# Patient Record
Sex: FEMALE | Race: WHITE | Employment: OTHER | URBAN - METROPOLITAN AREA
[De-identification: names, ages, dates, MRNs, and addresses within clinical notes are randomized per-mention and may not be internally consistent; named-entity substitution may affect disease eponyms.]

---

## 2020-05-20 ENCOUNTER — APPOINTMENT (OUTPATIENT)
Dept: CT IMAGING | Age: 85
End: 2020-05-20
Attending: STUDENT IN AN ORGANIZED HEALTH CARE EDUCATION/TRAINING PROGRAM
Payer: MEDICARE

## 2020-05-20 ENCOUNTER — HOSPITAL ENCOUNTER (OUTPATIENT)
Age: 85
Setting detail: OBSERVATION
Discharge: HOME | End: 2020-05-21
Attending: STUDENT IN AN ORGANIZED HEALTH CARE EDUCATION/TRAINING PROGRAM | Admitting: INTERNAL MEDICINE
Payer: MEDICARE

## 2020-05-20 ENCOUNTER — APPOINTMENT (OUTPATIENT)
Dept: GENERAL RADIOLOGY | Age: 85
End: 2020-05-20
Attending: STUDENT IN AN ORGANIZED HEALTH CARE EDUCATION/TRAINING PROGRAM
Payer: MEDICARE

## 2020-05-20 ENCOUNTER — APPOINTMENT (OUTPATIENT)
Dept: NON INVASIVE DIAGNOSTICS | Age: 85
End: 2020-05-20
Attending: INTERNAL MEDICINE
Payer: MEDICARE

## 2020-05-20 DIAGNOSIS — R55 NEAR SYNCOPE: ICD-10-CM

## 2020-05-20 DIAGNOSIS — N39.0 URINARY TRACT INFECTION WITHOUT HEMATURIA, SITE UNSPECIFIED: Primary | ICD-10-CM

## 2020-05-20 PROBLEM — R42 DIZZY: Status: ACTIVE | Noted: 2020-05-20

## 2020-05-20 LAB
ALBUMIN SERPL-MCNC: 3.8 G/DL (ref 3.5–5)
ALBUMIN/GLOB SERPL: 1.2 {RATIO} (ref 1.1–2.2)
ALP SERPL-CCNC: 53 U/L (ref 45–117)
ALT SERPL-CCNC: 17 U/L (ref 12–78)
ANION GAP SERPL CALC-SCNC: 8 MMOL/L (ref 5–15)
APPEARANCE UR: CLEAR
AST SERPL-CCNC: 13 U/L (ref 15–37)
ATRIAL RATE: 67 BPM
BACTERIA URNS QL MICRO: ABNORMAL /HPF
BASOPHILS # BLD: 0 K/UL (ref 0–0.1)
BASOPHILS NFR BLD: 0 % (ref 0–1)
BILIRUB SERPL-MCNC: 0.6 MG/DL (ref 0.2–1)
BILIRUB UR QL: NEGATIVE
BUN SERPL-MCNC: 12 MG/DL (ref 6–20)
BUN/CREAT SERPL: 16 (ref 12–20)
CALCIUM SERPL-MCNC: 8.8 MG/DL (ref 8.5–10.1)
CALCULATED P AXIS, ECG09: 28 DEGREES
CALCULATED R AXIS, ECG10: -77 DEGREES
CALCULATED T AXIS, ECG11: -43 DEGREES
CHLORIDE SERPL-SCNC: 103 MMOL/L (ref 97–108)
CHOLEST SERPL-MCNC: 196 MG/DL
CO2 SERPL-SCNC: 28 MMOL/L (ref 21–32)
COLOR UR: ABNORMAL
COMMENT, HOLDF: NORMAL
COMMENT, HOLDF: NORMAL
CREAT SERPL-MCNC: 0.77 MG/DL (ref 0.55–1.02)
DIAGNOSIS, 93000: NORMAL
DIFFERENTIAL METHOD BLD: NORMAL
ECHO AO ROOT DIAM: 2.83 CM
ECHO EST RA PRESSURE: 3 MMHG
ECHO LA VOL 2C: 24.42 ML (ref 22–52)
ECHO LA VOL 4C: 26.82 ML (ref 22–52)
ECHO LA VOL BP: 28.37 ML (ref 22–52)
ECHO LA VOL/BSA BIPLANE: 21.29 ML/M2 (ref 16–28)
ECHO LA VOLUME INDEX A2C: 18.33 ML/M2 (ref 16–28)
ECHO LA VOLUME INDEX A4C: 20.13 ML/M2 (ref 16–28)
ECHO LV EDV TEICHHOLZ: 0.31 ML
ECHO LV ESV TEICHHOLZ: 0.06 ML
ECHO LV INTERNAL DIMENSION DIASTOLIC: 3.35 CM (ref 3.9–5.3)
ECHO LV INTERNAL DIMENSION SYSTOLIC: 1.7 CM
ECHO LV IVSD: 1.13 CM (ref 0.6–0.9)
ECHO LV MASS 2D: 123.3 G (ref 67–162)
ECHO LV MASS INDEX 2D: 92.5 G/M2 (ref 43–95)
ECHO LV POSTERIOR WALL DIASTOLIC: 1.05 CM (ref 0.6–0.9)
ECHO LVOT DIAM: 1.82 CM
ECHO LVOT PEAK GRADIENT: 2 MMHG
ECHO LVOT PEAK VELOCITY: 70.72 CM/S
ECHO PULMONARY ARTERY SYSTOLIC PRESSURE (PASP): 32.5 MMHG
ECHO PV MAX VELOCITY: 78.64 CM/S
ECHO PV PEAK GRADIENT: 2.5 MMHG
ECHO RIGHT VENTRICULAR SYSTOLIC PRESSURE (RVSP): 32.5 MMHG
ECHO RV INTERNAL DIMENSION: 3.35 CM
ECHO TV REGURGITANT MAX VELOCITY: 271.44 CM/S
ECHO TV REGURGITANT PEAK GRADIENT: 29.5 MMHG
EOSINOPHIL # BLD: 0 K/UL (ref 0–0.4)
EOSINOPHIL NFR BLD: 1 % (ref 0–7)
EPITH CASTS URNS QL MICRO: ABNORMAL /LPF
ERYTHROCYTE [DISTWIDTH] IN BLOOD BY AUTOMATED COUNT: 13.1 % (ref 11.5–14.5)
EST. AVERAGE GLUCOSE BLD GHB EST-MCNC: 148 MG/DL
GLOBULIN SER CALC-MCNC: 3.2 G/DL (ref 2–4)
GLUCOSE BLD STRIP.AUTO-MCNC: 124 MG/DL (ref 65–100)
GLUCOSE BLD STRIP.AUTO-MCNC: 126 MG/DL (ref 65–100)
GLUCOSE BLD STRIP.AUTO-MCNC: 137 MG/DL (ref 65–100)
GLUCOSE SERPL-MCNC: 137 MG/DL (ref 65–100)
GLUCOSE UR STRIP.AUTO-MCNC: NEGATIVE MG/DL
HBA1C MFR BLD: 6.8 % (ref 4–5.6)
HCT VFR BLD AUTO: 40.6 % (ref 35–47)
HDLC SERPL-MCNC: 78 MG/DL
HDLC SERPL: 2.5 {RATIO} (ref 0–5)
HGB BLD-MCNC: 13.6 G/DL (ref 11.5–16)
HGB UR QL STRIP: ABNORMAL
HYALINE CASTS URNS QL MICRO: ABNORMAL /LPF (ref 0–5)
IMM GRANULOCYTES # BLD AUTO: 0 K/UL (ref 0–0.04)
IMM GRANULOCYTES NFR BLD AUTO: 0 % (ref 0–0.5)
KETONES UR QL STRIP.AUTO: NEGATIVE MG/DL
LDLC SERPL CALC-MCNC: 92.6 MG/DL (ref 0–100)
LEUKOCYTE ESTERASE UR QL STRIP.AUTO: ABNORMAL
LIPID PROFILE,FLP: NORMAL
LVFS 2D: 49.24 %
LVSV (TEICH): 23.89 ML
LYMPHOCYTES # BLD: 2 K/UL (ref 0.8–3.5)
LYMPHOCYTES NFR BLD: 33 % (ref 12–49)
MAGNESIUM SERPL-MCNC: 1.9 MG/DL (ref 1.6–2.4)
MCH RBC QN AUTO: 31.4 PG (ref 26–34)
MCHC RBC AUTO-ENTMCNC: 33.5 G/DL (ref 30–36.5)
MCV RBC AUTO: 93.8 FL (ref 80–99)
MONOCYTES # BLD: 0.5 K/UL (ref 0–1)
MONOCYTES NFR BLD: 8 % (ref 5–13)
NEUTS SEG # BLD: 3.5 K/UL (ref 1.8–8)
NEUTS SEG NFR BLD: 58 % (ref 32–75)
NITRITE UR QL STRIP.AUTO: POSITIVE
NRBC # BLD: 0 K/UL (ref 0–0.01)
NRBC BLD-RTO: 0 PER 100 WBC
P-R INTERVAL, ECG05: 116 MS
PH UR STRIP: 7.5 [PH] (ref 5–8)
PLATELET # BLD AUTO: 157 K/UL (ref 150–400)
PMV BLD AUTO: 11.5 FL (ref 8.9–12.9)
POTASSIUM SERPL-SCNC: 3.9 MMOL/L (ref 3.5–5.1)
PROCALCITONIN SERPL-MCNC: <0.05 NG/ML
PROT SERPL-MCNC: 7 G/DL (ref 6.4–8.2)
PROT UR STRIP-MCNC: NEGATIVE MG/DL
Q-T INTERVAL, ECG07: 394 MS
QRS DURATION, ECG06: 116 MS
QTC CALCULATION (BEZET), ECG08: 416 MS
RBC # BLD AUTO: 4.33 M/UL (ref 3.8–5.2)
RBC #/AREA URNS HPF: ABNORMAL /HPF (ref 0–5)
SAMPLES BEING HELD,HOLD: NORMAL
SAMPLES BEING HELD,HOLD: NORMAL
SERVICE CMNT-IMP: ABNORMAL
SODIUM SERPL-SCNC: 139 MMOL/L (ref 136–145)
SP GR UR REFRACTOMETRY: 1.01 (ref 1–1.03)
TRIGL SERPL-MCNC: 127 MG/DL (ref ?–150)
TROPONIN I SERPL-MCNC: <0.05 NG/ML
TROPONIN I SERPL-MCNC: <0.05 NG/ML
TSH SERPL DL<=0.05 MIU/L-ACNC: 1.03 UIU/ML (ref 0.36–3.74)
UROBILINOGEN UR QL STRIP.AUTO: 0.2 EU/DL (ref 0.2–1)
VENTRICULAR RATE, ECG03: 67 BPM
VLDLC SERPL CALC-MCNC: 25.4 MG/DL
WBC # BLD AUTO: 6 K/UL (ref 3.6–11)
WBC URNS QL MICRO: ABNORMAL /HPF (ref 0–4)

## 2020-05-20 PROCEDURE — 96374 THER/PROPH/DIAG INJ IV PUSH: CPT

## 2020-05-20 PROCEDURE — 84145 PROCALCITONIN (PCT): CPT

## 2020-05-20 PROCEDURE — 84443 ASSAY THYROID STIM HORMONE: CPT

## 2020-05-20 PROCEDURE — 96361 HYDRATE IV INFUSION ADD-ON: CPT

## 2020-05-20 PROCEDURE — 99218 HC RM OBSERVATION: CPT

## 2020-05-20 PROCEDURE — 84484 ASSAY OF TROPONIN QUANT: CPT

## 2020-05-20 PROCEDURE — 74011250636 HC RX REV CODE- 250/636: Performed by: INTERNAL MEDICINE

## 2020-05-20 PROCEDURE — 82962 GLUCOSE BLOOD TEST: CPT

## 2020-05-20 PROCEDURE — 96372 THER/PROPH/DIAG INJ SC/IM: CPT

## 2020-05-20 PROCEDURE — 36415 COLL VENOUS BLD VENIPUNCTURE: CPT

## 2020-05-20 PROCEDURE — 83735 ASSAY OF MAGNESIUM: CPT

## 2020-05-20 PROCEDURE — 93005 ELECTROCARDIOGRAM TRACING: CPT

## 2020-05-20 PROCEDURE — 74011250637 HC RX REV CODE- 250/637: Performed by: INTERNAL MEDICINE

## 2020-05-20 PROCEDURE — 74011250636 HC RX REV CODE- 250/636: Performed by: STUDENT IN AN ORGANIZED HEALTH CARE EDUCATION/TRAINING PROGRAM

## 2020-05-20 PROCEDURE — 80053 COMPREHEN METABOLIC PANEL: CPT

## 2020-05-20 PROCEDURE — 71045 X-RAY EXAM CHEST 1 VIEW: CPT

## 2020-05-20 PROCEDURE — 93306 TTE W/DOPPLER COMPLETE: CPT

## 2020-05-20 PROCEDURE — 99285 EMERGENCY DEPT VISIT HI MDM: CPT

## 2020-05-20 PROCEDURE — 80061 LIPID PANEL: CPT

## 2020-05-20 PROCEDURE — 74011000250 HC RX REV CODE- 250: Performed by: STUDENT IN AN ORGANIZED HEALTH CARE EDUCATION/TRAINING PROGRAM

## 2020-05-20 PROCEDURE — 81001 URINALYSIS AUTO W/SCOPE: CPT

## 2020-05-20 PROCEDURE — 70450 CT HEAD/BRAIN W/O DYE: CPT

## 2020-05-20 PROCEDURE — 83036 HEMOGLOBIN GLYCOSYLATED A1C: CPT

## 2020-05-20 PROCEDURE — 77030038269 HC DRN EXT URIN PURWCK BARD -A

## 2020-05-20 PROCEDURE — 85025 COMPLETE CBC W/AUTO DIFF WBC: CPT

## 2020-05-20 RX ORDER — DIPHENHYDRAMINE HCL 25 MG
25 CAPSULE ORAL
Status: DISCONTINUED | OUTPATIENT
Start: 2020-05-20 | End: 2020-05-21 | Stop reason: HOSPADM

## 2020-05-20 RX ORDER — SODIUM CHLORIDE 9 MG/ML
500 INJECTION, SOLUTION INTRAVENOUS ONCE
Status: COMPLETED | OUTPATIENT
Start: 2020-05-20 | End: 2020-05-20

## 2020-05-20 RX ORDER — LISINOPRIL 20 MG/1
40 TABLET ORAL DAILY
Status: DISCONTINUED | OUTPATIENT
Start: 2020-05-20 | End: 2020-05-21 | Stop reason: HOSPADM

## 2020-05-20 RX ORDER — ENOXAPARIN SODIUM 100 MG/ML
40 INJECTION SUBCUTANEOUS DAILY
Status: DISCONTINUED | OUTPATIENT
Start: 2020-05-20 | End: 2020-05-21 | Stop reason: HOSPADM

## 2020-05-20 RX ORDER — SODIUM CHLORIDE 9 MG/ML
75 INJECTION, SOLUTION INTRAVENOUS CONTINUOUS
Status: DISCONTINUED | OUTPATIENT
Start: 2020-05-20 | End: 2020-05-21 | Stop reason: HOSPADM

## 2020-05-20 RX ORDER — MAGNESIUM SULFATE 100 %
4 CRYSTALS MISCELLANEOUS AS NEEDED
Status: DISCONTINUED | OUTPATIENT
Start: 2020-05-20 | End: 2020-05-21 | Stop reason: HOSPADM

## 2020-05-20 RX ORDER — CLOPIDOGREL BISULFATE 75 MG/1
75 TABLET ORAL DAILY
Status: DISCONTINUED | OUTPATIENT
Start: 2020-05-20 | End: 2020-05-21 | Stop reason: HOSPADM

## 2020-05-20 RX ORDER — INSULIN LISPRO 100 [IU]/ML
INJECTION, SOLUTION INTRAVENOUS; SUBCUTANEOUS
Status: DISCONTINUED | OUTPATIENT
Start: 2020-05-20 | End: 2020-05-21 | Stop reason: HOSPADM

## 2020-05-20 RX ORDER — ACETAMINOPHEN 325 MG/1
650 TABLET ORAL
Status: DISCONTINUED | OUTPATIENT
Start: 2020-05-20 | End: 2020-05-21 | Stop reason: HOSPADM

## 2020-05-20 RX ORDER — METFORMIN HYDROCHLORIDE 500 MG/1
500 TABLET ORAL 2 TIMES DAILY WITH MEALS
COMMUNITY

## 2020-05-20 RX ORDER — DEXTROSE MONOHYDRATE 100 MG/ML
0-250 INJECTION, SOLUTION INTRAVENOUS AS NEEDED
Status: DISCONTINUED | OUTPATIENT
Start: 2020-05-20 | End: 2020-05-21 | Stop reason: CLARIF

## 2020-05-20 RX ORDER — GLIPIZIDE 5 MG/1
10 TABLET, FILM COATED, EXTENDED RELEASE ORAL 2 TIMES DAILY
Status: DISCONTINUED | OUTPATIENT
Start: 2020-05-20 | End: 2020-05-20

## 2020-05-20 RX ORDER — PANTOPRAZOLE SODIUM 40 MG/1
40 TABLET, DELAYED RELEASE ORAL
Status: DISCONTINUED | OUTPATIENT
Start: 2020-05-21 | End: 2020-05-20

## 2020-05-20 RX ORDER — METFORMIN HYDROCHLORIDE 500 MG/1
500 TABLET ORAL 2 TIMES DAILY WITH MEALS
Status: DISCONTINUED | OUTPATIENT
Start: 2020-05-20 | End: 2020-05-21 | Stop reason: HOSPADM

## 2020-05-20 RX ORDER — ZOLPIDEM TARTRATE 5 MG/1
5 TABLET ORAL
Status: DISCONTINUED | OUTPATIENT
Start: 2020-05-20 | End: 2020-05-21 | Stop reason: HOSPADM

## 2020-05-20 RX ORDER — AMLODIPINE BESYLATE 5 MG/1
2.5 TABLET ORAL DAILY
Status: DISCONTINUED | OUTPATIENT
Start: 2020-05-20 | End: 2020-05-20

## 2020-05-20 RX ORDER — PRAVASTATIN SODIUM 20 MG/1
20 TABLET ORAL
Status: DISCONTINUED | OUTPATIENT
Start: 2020-05-20 | End: 2020-05-20

## 2020-05-20 RX ADMIN — ENOXAPARIN SODIUM 40 MG: 40 INJECTION SUBCUTANEOUS at 12:52

## 2020-05-20 RX ADMIN — CLOPIDOGREL BISULFATE 75 MG: 75 TABLET ORAL at 12:52

## 2020-05-20 RX ADMIN — CEFTRIAXONE 1 G: 1 INJECTION, POWDER, FOR SOLUTION INTRAMUSCULAR; INTRAVENOUS at 10:10

## 2020-05-20 RX ADMIN — LISINOPRIL 40 MG: 20 TABLET ORAL at 12:52

## 2020-05-20 RX ADMIN — SODIUM CHLORIDE 500 ML: 900 INJECTION, SOLUTION INTRAVENOUS at 07:43

## 2020-05-20 RX ADMIN — SODIUM CHLORIDE 75 ML/HR: 900 INJECTION, SOLUTION INTRAVENOUS at 12:20

## 2020-05-20 RX ADMIN — METFORMIN HYDROCHLORIDE 500 MG: 500 TABLET ORAL at 16:35

## 2020-05-20 RX ADMIN — SODIUM CHLORIDE 500 ML: 900 INJECTION, SOLUTION INTRAVENOUS at 10:10

## 2020-05-20 NOTE — ED PROVIDER NOTES
The patient is an 63-year-old female history of diabetes, hyperparathyroidism, hyperlipidemia, hypertension presenting to the emergency department today with fatigue and dizziness. She states that last night when she went to bed her blood pressure was a little bit elevated so she took her usual medications and felt otherwise fine. This morning she woke up around 4:30 AM which is her typical wake-up time and she felt fine. She got up to make coffee and after standing for a few minutes she got dizzy and felt as if she may pass out. She was able to sit down and symptoms improved within a minute or 2. Currently she is completely asymptomatic. She denies any sweatiness, chest pain, shortness of breath, focal weakness or numbness during the event. She denies any recent illness with vomiting or diarrhea. No recent fevers or chills. She denies any abdominal pain. Denies any recent medication changes. She is not on any blood thinners other than Plavix. Past Medical History:   Diagnosis Date    Constipation     Diabetes ([facility]     Glaucoma     Hyperlipemia     Hyperparathyroidism ([facility]     Hypertension     Joint pain     Kidney stone     Migraine     Osteoporosis     TIA (transient ischemic attack)        No past surgical history on file.       Family History:   Problem Relation Age of Onset    Cancer Mother         bladder       Social History     Socioeconomic History    Marital status:      Spouse name: Not on file    Number of children: Not on file    Years of education: Not on file    Highest education level: Not on file   Occupational History    Not on file   Social Needs    Financial resource strain: Not on file    Food insecurity     Worry: Not on file     Inability: Not on file    Transportation needs     Medical: Not on file     Non-medical: Not on file   Tobacco Use    Smoking status: Former Smoker    Smokeless tobacco: Never Used   Substance and Sexual Activity    Alcohol use: No    Drug use: Not on file    Sexual activity: Not on file   Lifestyle    Physical activity     Days per week: Not on file     Minutes per session: Not on file    Stress: Not on file   Relationships    Social connections     Talks on phone: Not on file     Gets together: Not on file     Attends Taoism service: Not on file     Active member of club or organization: Not on file     Attends meetings of clubs or organizations: Not on file     Relationship status: Not on file    Intimate partner violence     Fear of current or ex partner: Not on file     Emotionally abused: Not on file     Physically abused: Not on file     Forced sexual activity: Not on file   Other Topics Concern    Not on file   Social History Narrative    Not on file         ALLERGIES: Codeine; Contrast dye [iodine]; Seafood; and Zocor [simvastatin]    Review of Systems   Constitutional: Negative for chills and fever. HENT: Negative for congestion and rhinorrhea. Eyes: Negative for redness and visual disturbance. Respiratory: Negative for cough and shortness of breath. Cardiovascular: Negative for chest pain and leg swelling. Gastrointestinal: Negative for abdominal pain, diarrhea, nausea and vomiting. Genitourinary: Negative for dysuria, flank pain, frequency, hematuria and urgency. Musculoskeletal: Negative for arthralgias, back pain, myalgias and neck pain. Skin: Negative for rash and wound. Allergic/Immunologic: Negative for immunocompromised state. Neurological: Positive for dizziness. Negative for headaches. Vitals:    05/20/20 0732   BP: 173/74   Pulse: 76   Resp: 18   Temp: 98.4 °F (36.9 °C)   SpO2: 97%   Weight: 59.4 kg (131 lb)   Height: 4' 9\" (1.448 m)            Physical Exam  Vitals signs and nursing note reviewed. Constitutional:       General: She is not in acute distress. Appearance: She is well-developed. She is not diaphoretic. HENT:      Head: Normocephalic. Mouth/Throat:      Pharynx: No oropharyngeal exudate. Eyes:      General:         Right eye: No discharge. Left eye: No discharge. Pupils: Pupils are equal, round, and reactive to light. Neck:      Musculoskeletal: Normal range of motion and neck supple. Cardiovascular:      Rate and Rhythm: Normal rate and regular rhythm. Heart sounds: Normal heart sounds. No murmur. No friction rub. No gallop. Pulmonary:      Effort: Pulmonary effort is normal. No respiratory distress. Breath sounds: Normal breath sounds. No stridor. No wheezing or rales. Abdominal:      General: Bowel sounds are normal. There is no distension. Palpations: Abdomen is soft. Tenderness: There is no abdominal tenderness. There is no guarding or rebound. Musculoskeletal: Normal range of motion. General: No deformity. Skin:     General: Skin is warm and dry. Capillary Refill: Capillary refill takes less than 2 seconds. Findings: No rash. Neurological:      Mental Status: She is alert and oriented to person, place, and time. Comments: CN II-XII tested and intact  Speech is clear and fluid  Tongue protrusion normal  5/5 b/l strength with shoulder/elbow flexion and extension  Equal  strength  5/5 b/l strength with hip extension, knee flexion/extension  Symmetric dorsi and plantar-flexion of feet  Sensation intact in face and throughout all 4 extremities  No dysmetria   No truncal ataxia    Psychiatric:         Behavior: Behavior normal.          EKG Interpretation:   ED Physician interpretation  NSR rate 67, LAD with RBBB, no ST elevation or depression; unchanged compared to prior 12/27/14    Labs Reviewed:   UA consistent with UTI  No leukocytosis or anemia  Troponin normal    Imaging Reviewed:   Chest x-ray without acute process  CT head without acute process      Course:  500 mL of IV fluids given    9:44 AM pt re-evaluated. Just got up to use bedside commode.  Was markedly fatigued, dizzy and tachycardic to 130's. Hospitalist Garo Serve for Admission  9:54 AM    ED Room Number: ZB72/03  Patient Name and age:  Clemente Inman 80 y.o.  female  Working Diagnosis:   1. Urinary tract infection without hematuria, site unspecified    2. Near syncope        COVID-19 Suspicion:  no    Code Status:  Full Code  Readmission: no  Isolation Requirements:  no  Recommended Level of Care:  telemetry  Department:[facility] ED - [phone]  Other:  80 y.o. female near syncope at home, has UTI otherwise workup ok. Received IVF 500cc, stood her up and gets markedly dizzy, tachycardic to 130s/sob. Getting ctx for abx. MDM:  80-year-old female presented today with dizziness and near syncope at home just prior to arrival.  Upon my assessment she is neurologically intact. Her blood pressure is elevated but her vital signs have been otherwise stable. No focal deficits to suggest ICH. History is not consistent with TIA. EKG without significant arrhythmia or signs of ischemia. Troponin normal.  No  significant electrolyte disturbance or significant anemia. She was found to have a UTI which was treated with ceftriaxone. Initial plan was to discharge her home however when she stood up to use the commode she got markedly fatigued, nearly passed out again and was tachycardic to the 130s. This time I do not feel it is safe for the patient to return home therefore I will admit her for IV hydration, antibiotics and further management. Clinical Impression:     ICD-10-CM ICD-9-CM    1. Urinary tract infection without hematuria, site unspecified N39.0 599.0    2.  Near syncope R55 780.2            Disposition: [address] [place] [address],

## 2020-05-20 NOTE — DIABETES MGMT
Diabetes CNS received call back from Dr. Any Balbuena. Patient's home diabetes medication regime is 500 mg Metformin twice daily. Patient's most recent visit with Dr. Reuben Urbina for refills was 5/2/20. Patient has not been on glipizide since 2018. Updated diabetes medication history table below:    Diabetes medication history  Drug class Currently in use Discontinued Never used   Biguanide Metformin 500 mg twice daily       DDP-4 inhibitor          Sulfonylurea        Thiazolidinedione         GLP-1 RA         SGLT-2 inhibitors         Basal insulin         Bolus insulin           Recommendations     Updated recommendations:    Continue current diabetes medication regime of Metformin 500 mg twice daily and corrective insulin.        Use Subcutaneous Insulin Order set (5714):  Corrective insulin  Correctional Scale for Normal Sensitivity     200-249- 2units Humalog  [id number] Humalog  277-239-0kwlyp Humalog  [id number] Humalog  Over 400- 10units Humalog     Do NOT hold for NPO; give in addition to meal time insulin dose.     If patient does not eat, -give correction dose only.        Consider referrals     [x]?         Diabetes Self-Management Training through [facility] (Phone [phone] to schedule appointment)

## 2020-05-20 NOTE — ROUTINE PROCESS
Bedside shift change report given to Be Donato RN (oncoming nurse) by Ihsan Zhong RN (offgoing nurse). Report included the following information SBAR, Kardex, Intake/Output, MAR and Accordion.

## 2020-05-20 NOTE — ED NOTES
TRANSFER - OUT REPORT:    Verbal report given to Emiliana RN (name) on [facility]  being transferred to 5th floor (unit) for routine progression of care       Report consisted of patients Situation, Background, Assessment and   Recommendations(SBAR). Information from the following report(s) SBAR, Kardex, ED Summary, Intake/Output, Recent Results and Cardiac Rhythm NSR was reviewed with the receiving nurse. Lines:   Peripheral IV 05/20/20 Right Antecubital (Active)   Site Assessment Clean, dry, & intact 5/20/2020  7:41 AM   Phlebitis Assessment 0 5/20/2020  7:41 AM   Dressing Status Clean, dry, & intact 5/20/2020  7:41 AM   Dressing Type Transparent 5/20/2020  7:41 AM   Hub Color/Line Status Pink 5/20/2020  7:41 AM   Action Taken Blood drawn 5/20/2020  7:41 AM   Alcohol Cap Used Yes 5/20/2020  7:41 AM        Opportunity for questions and clarification was provided.       Patient transported with:   Monitor  Registered Nurse

## 2020-05-20 NOTE — PROGRESS NOTES
.Reason for Admission:   Urinary tract infection                   RUR Score:          N/a patient is OBS           Plan for utilizing home health:      Patient states she has a visit once a year from a [facility] home health type service. PCP: First and Last name:  Patsy Law   Name of Practice:    Are you a current patient: Yes/No:  Yes   Approximate date of last visit:  Patient states she had a telehealth visit approx. 2 weeks ago. Current Advanced Directive/Advance Care Plan:  Full code, no AD on file, decision maker is daughter Nelson Giles [phone]                          Transition of Care Plan:                     I conducted the assessment with the patient at the bedside. I wore a mask for the duration of the evaluation. Patient lives with her daughter in a single story home with 5-6 steps in. Prior to hospital admission, she was independent with ADLs and active. She does not drive, but when she needs transportation it is provided by her family. Patient has what she describes as an annual visit from a [facility] home health service. She does not use any DME. She has prescription coverage under her insurance and usually gets her prescriptions from the [address] [place] [address] @ [facility]. She had a telehealth appointment with her PCP, Dr. Praneeth Rivas, about two weeks ago. When she is ready for discharge one of her daughters will be able to transport her home. Noted that patient is OBS status; provided patient with education about this; State Obs and [place] letters signed and placed on chart. Plan:   1. Noted PT consult to help determine discharge needs  2. Likely to discharge home with family assistance  3. Follow up with PCP, specialists as recommended  4. CM to continue to follow and assist with discharge planning. Care Management Interventions  PCP Verified by CM: Yes(Dr. Patsy Law)  Mode of Transport at Discharge:  Other (see comment)(Family)  MyChart Signup: No  Discharge Durable Medical Equipment: No  Health Maintenance Reviewed: Yes  Physical Therapy Consult: Yes  Occupational Therapy Consult: No  Speech Therapy Consult: No  Current Support Network:  Other(Daughter lives with her)  Confirm Follow Up Transport: Family  Discharge Location  Discharge Placement: Home with family assistance    Nawaf Mora

## 2020-05-20 NOTE — ED NOTES
Gave update to Areli Mejia w/ pt's permission.  Phone number for [facility] given to pt per her request.

## 2020-05-20 NOTE — H&P
[facility]    Hospitalist Admission Note      NAME:  John Friedman   :   1933   MRN:  [id number]     PCP:  Jovanny Sanford MD     Date/Time of service:  2020 10:45 AM          Subjective:     CHIEF COMPLAINT: presyncope     HISTORY OF PRESENT ILLNESS:     Ms. Janice Daugherty is a 80 y.o.  female who presented to the Emergency Department complaining of presyncope. She is a poor historian, tangential, and may have dementia. No family is present. Presyncope occurred this AM and resolved. She was up having coffee, prior to eating. ER workup finds UTI and no other issues. NO SIRS criteria. We will admit her for observation. Past Medical History:   Diagnosis Date    Constipation     [facility] ([facility]     Glaucoma     Hyperlipemia     Hyperparathyroidism ([facility]     Hypertension     Joint pain     Kidney stone     Migraine     Osteoporosis     Hx completed stroke         No past surgical history on file. Social History     Tobacco Use    Smoking status: Former Smoker    Smokeless tobacco: Never Used   Substance Use Topics    Alcohol use: No        Family History   Problem Relation Age of Onset    Cancer Mother         bladder      Family hx cannot be fully assessed, since the patient cannot provide information    Allergies   Allergen Reactions    Codeine Nausea and Vomiting    Contrast Dye [Iodine] Rash    Seafood Nausea and Vomiting    Zocor [Simvastatin] Other (comments)     Leg cramps          Prior to Admission medications    Medication Sig Start Date End Date Taking? Authorizing Provider   miscellaneous medical supply (BLOOD PRESSURE CUFF) misc by Does Not Apply route. Yes Provider, Historical   metFORMIN (GLUCOPHAGE) 1,000 mg tablet Take 1 tablet by mouth two (2) times daily (with meals). 14  Yes Nazario Weaver MD   clopidogrel (PLAVIX) 75 mg tablet Take 1 tablet by mouth daily.  14  Yes Can Dunlap MD   lisinopril (PRINIVIL, ZESTRIL) 40 mg tablet Take 40 mg by mouth daily. Yes Other, MD Altagracia   butalbital-aspirin-caffeine [facility]) capsule Take 1 capsule by mouth every eight (8) hours as needed for Headache. Yes Provider, Historical   amLODIPine (NORVASC) 2.5 mg tablet Take 1 tablet by mouth daily. 12/30/14   Laurie Richards MD   pravastatin (PRAVACHOL) 20 mg tablet Take 1 tablet by mouth nightly. 12/28/14   Compa Buckley MD   glipiZIDE SR (GLUCOTROL) 10 mg CR tablet Take 10 mg by mouth two (2) times a day. Provider, Historical   omeprazole (PRILOSEC) 20 mg capsule Take 20 mg by mouth daily. Provider, Historical   cholecalciferol, VITAMIN D3, (VITAMIN D3) 5,000 unit tab tablet Take 5,000 Units by mouth every seven (7) days.  On Thursdays    Provider, Historical       Review of Systems:  (bold if positive, if negative)    Gen:  Eyes:  ENT:  CVS:  syncopePulm:  GI:  :  MS:  Skin:  Psych:  Endo:  Hem:  Renal:  Neuro:        Objective:      VITALS:    Vital signs reviewed; most recent are:    Visit Vitals  /72   Pulse 88   Temp 98.4 °F (36.9 °C)   Resp 23   Ht 4' 9\" (1.448 m)   Wt 59.4 kg (131 lb)   SpO2 100%   BMI 28.35 kg/m²     SpO2 Readings from Last 6 Encounters:   05/20/20 100%   01/12/15 97%   12/29/14 98%            Intake/Output Summary (Last 24 hours) at 5/20/2020 1045  Last data filed at 5/20/2020 8470  Gross per 24 hour   Intake 500 ml   Output    Net 500 ml        Exam:     Physical Exam:    Gen:  Frail, in no acute distress  HEENT:  Pink conjunctivae, PERRL, hearing intact to voice, moist mucous membranes  Neck:  Supple, without masses, thyroid non-tender  Resp:  No accessory muscle use, clear breath sounds without wheezes rales or rhonchi  Card:  No murmurs, normal S1, S2 without thrills, bruits or peripheral edema  Abd:  Soft, non-tender, non-distended, normoactive bowel sounds are present, no mass  Lymph:  No cervical or inguinal adenopathy  Musc:  No cyanosis or clubbing  Skin:  No rashes or ulcers, skin turgor is good  Neuro:  Cranial nerves are grossly intact, general motor weakness, follows commands   Psych:  Poor insight, oriented to person, place and time, tearful     Labs:    Recent Labs     05/20/20  0742   WBC 6.0   HGB 13.6   HCT 40.6        Recent Labs     05/20/20  0742      K 3.9      CO2 28   *   BUN 12   CREA 0.77   CA 8.8   MG 1.9   ALB 3.8   TBILI 0.6   SGOT 13*   ALT 17     Lab Results   Component Value Date/Time    Glucose (POC) 145 (H) 12/29/2014 11:39 AM    Glucose (POC) 159 (H) 12/29/2014 07:50 AM     No results for input(s): PH, PCO2, PO2, HCO3, FIO2 in the last 72 hours. No results for input(s): INR, INREXT in the last 72 hours. All Micro Results     None          I have reviewed previous records       Assessment and Plan:      UTI (urinary tract infection) - POA, no SIRS criteria. Start ceftriaxone and check cx    Dizzy - Symptoms resolved. Unclear etiology. DDx hypotension, hypoglycemia, arrhythmia etc.  Check orthostatics and ECHO. Hydrate. Continue all usual meds    Diabetes type 2 with vascular complications - Diabetic diet and counseling. SSI per protocol. Continue home metformin and glipizide, and see if glipizide leads to hypoglycemia. Check A1c.    HTN (hypertension) - continue norvasc low dose and lisinopril high dose. ACE can exacerbate hypotension, BB should be used over norvasc given hx of CVA    Hx completed stroke - Noted on MRI 7 years ago. No current symptoms. Continue Plavix,     Glaucoma - No drops listed    Hyperlipemia - check panel and continue pravastatin    Hyperparathyroidism - No meds    Joint pain / Osteoporosis - Tylneol prn    Telemetry reviewed:   normal sinus rhythm    Risk of deterioration: high      Total time spent with patient: 79 Minutes I personally reviewed chart, notes, data and current medications in the medical record. I have personally examined and treated the patient at bedside during this period. Care Plan discussed with: Patient, Nursing Staff and >50% of time spent in counseling and coordination of care    Discussed:  D/C Planning       ___________________________________________________    Attending Physician: Lon Quispe MD

## 2020-05-20 NOTE — DIABETES MGMT
[facility]  CLINICAL NURSE SPECIALIST CONSULT    Presentation   Andre Hair is a 80 y.o. female admitted for further evaluation of fatigue and dizziness. PMH is constipation, diabetes, glaucoma, HTN, hyperparathyroidism, join pain, kidney stones, migraines, osteoporosis and CVA. Patient has known Type 2 diabetes, diagnosed approximately 2010. Per chart review, home diabetic medication regime is Metformin 1000 mg twice a day and glipizide 10 mg twice a day. Patient reports that she is no longer taking glipizide and only taking 500 mg of Metformin. Patient is a poor historian so unsure which is the correct doses she is currently taking at home. Called and left message for Dr. Marti Laehsan office (patient's PCP) to get confirmation of home medication regime, awaiting call back. Consulted by Provider for advanced diabetes nursing assessment and care, specifically related to     [x] Home management assessment      Current clinical course has been uncomplicated. Diabetes-related medical history  Acute complications  denies  Neurological complications  Peripheral neuropathy  Microvascular disease  denies  Macrovascular disease  Cerebral vascular accident  Other associated conditions     denies    Diabetes medication history  Drug class Currently in use Discontinued Never used   Biguanide Metformin 1000 mg twice daily     DDP-4 inhibitor       Sulfonylurea Glipizide 10 mg twice daily     Thiazolidinedione      GLP-1 RA      SGLT-2 inhibitors      Basal insulin      Bolus insulin        Subjective   I have all the stuff to check my blood sugar at home I just haven't been doing it. I see my doctor every 3 months and my A1c is always less than 7    Patient reports the following home diabetes self-care practices:  Eating pattern  [x] Breakfast Coffee and sausage biscuit or sausage egg and cheese biscuit  [x] Lunch  [place] or salad  [x] Dinner  Salad/ spaghetti/ chicken with vegetables. [x] Snacks popcorn  [x] Beverages Coffee, V8 juice, coke zero occasionally, milk  Physical activity pattern  Patient reports that she was going to aerobics class weekly at her [facility] until it was closed with the COVID-19 closures. Monitoring pattern  Patient reports that she has the monitor and all the supplies at home, recently replaced the battery in her monitor but she is not checking her blood sugars at home. Taking medications pattern  [x] Consistent administration  [x] Affordable    Social determinants of health impacting diabetes self-management practices   Patient denies any issues or concerns at this time. Objective   Physical exam  General Alert, oriented and in no acute distress. Conversant and cooperative  Vital Signs   Visit Vitals  /86   Pulse (!) 108   Temp 98.4 °F (36.9 °C)   Resp 20   Ht 4' 9\" (1.448 m)   Wt 44.8 kg (98 lb 12.3 oz)   SpO2 98%   BMI 21.37 kg/m²   . Orthostatic BP measurement not indicated  Skin  Warm and dry. No Acanthosis noted along neckline. No lipohypertrophy or lipoatrophy noted at injection sites   Neck   Thyroid smooth and non-tender  Heart   Regular rate and rhythm.  No murmurs, rubs or gallops  Lungs  Clear without rales or rhonchi  Extremities Diabetic foot exam:    Left Foot     Visual Exam: normal    Pulse DP: 1+ (weak)   Filament test: reduced sensation      Right Foot   Visual Exam: normal    Pulse DP: 1+ (weak)   Filament test: reduced sensation         Laboratory  Lab Results   Component Value Date/Time    Hemoglobin A1c 7.8 (H) 12/28/2014 03:25 AM     Lab Results   Component Value Date/Time    LDL, calculated 92.6 05/20/2020 07:42 AM     Lab Results   Component Value Date/Time    Creatinine 0.77 05/20/2020 07:42 AM     Lab Results   Component Value Date/Time    Sodium 139 05/20/2020 07:42 AM    Potassium 3.9 05/20/2020 07:42 AM    Chloride 103 05/20/2020 07:42 AM    CO2 28 05/20/2020 07:42 AM    Anion gap 8 05/20/2020 07:42 AM    Glucose 137 (H) 05/20/2020 07:42 AM    BUN 12 05/20/2020 07:42 AM    Creatinine 0.77 05/20/2020 07:42 AM    BUN/Creatinine ratio 16 05/20/2020 07:42 AM    GFR est AA >60 05/20/2020 07:42 AM    GFR est non-AA >60 05/20/2020 07:42 AM    Calcium 8.8 05/20/2020 07:42 AM    Bilirubin, total 0.6 05/20/2020 07:42 AM    AST (SGOT) 13 (L) 05/20/2020 07:42 AM    Alk. phosphatase 53 05/20/2020 07:42 AM    Protein, total 7.0 05/20/2020 07:42 AM    Albumin 3.8 05/20/2020 07:42 AM    Globulin 3.2 05/20/2020 07:42 AM    A-G Ratio 1.2 05/20/2020 07:42 AM    ALT (SGPT) 17 05/20/2020 07:42 AM     Lab Results   Component Value Date/Time    ALT (SGPT) 17 05/20/2020 07:42 AM       Blood glucose pattern        Evaluation   This 80year old female with known diabetes has achieved inpatient blood glucose target of 140-180mg/dl. Patient arrived to the ED this morning (5/20/20) and has had only two BG readings since arrival, both were below the inpatient target of < 180 mg/dl. Patient's home medications of Metformin 1000 mg twice a day and Glipizide 10 mg twice a day to start at dinner time today (5/20/20). Basal insulin isn't in use. Bolus insulin isn't in use. Patient is eating meals. Regular cardiac diet ordered. Corrective insulin is in use. Patient has not required any corrective insulin since arriving to the ED this morning.      Assessment and Plan   Nursing Diagnosis Risk for unstable blood glucose pattern   Nursing Intervention Domain 2381 Decision-making Support   Nursing Interventions Examined current inpatient diabetes control   Explored factors facilitating and impeding inpatient management  Identified self-management practices impeding diabetes control  Explored corrective strategies with patient and responsible inpatient provider   Informed patient of rational for basal bolus insulin strategy while hospitalized       Recommendations     Continue current diabetes medication regime of Metformin 1000 mg twice daily, Glipizide 10 mg twice daily and corrective insulin. Will updated recommendations if necessary after speaking to patient's PCP. Use Subcutaneous Insulin Order set ([id number]):  Corrective insulin  Correctional Scale for Normal Sensitivity    200-249- 2units Humalog  [id number] Humalog  [id number] Humalog  [id number] Humalog  Over 400- 10units Humalog    Do NOT hold for NPO; give in addition to meal time insulin dose. If patient does not eat, -give correction dose only. Consider referrals    [x] Diabetes Self-Management Training through [facility] (Phone [phone] to schedule appointment)    Time Spent     Total time spent with patient: 30 Minutes   I personally reviewed chart, notes, data and current medications in the medical record. I have personally examined and treated the patient at bedside during this period.     JHONNY Adam  Access via [employer]  [id number]

## 2020-05-21 VITALS
HEIGHT: 57 IN | BODY MASS INDEX: 21.31 KG/M2 | OXYGEN SATURATION: 94 % | RESPIRATION RATE: 18 BRPM | DIASTOLIC BLOOD PRESSURE: 86 MMHG | TEMPERATURE: 98.5 F | SYSTOLIC BLOOD PRESSURE: 176 MMHG | HEART RATE: 110 BPM | WEIGHT: 98.77 LBS

## 2020-05-21 LAB
ALBUMIN SERPL-MCNC: 3 G/DL (ref 3.5–5)
ALBUMIN/GLOB SERPL: 1 {RATIO} (ref 1.1–2.2)
ALP SERPL-CCNC: 46 U/L (ref 45–117)
ALT SERPL-CCNC: 15 U/L (ref 12–78)
ANION GAP SERPL CALC-SCNC: 3 MMOL/L (ref 5–15)
AST SERPL-CCNC: 17 U/L (ref 15–37)
BILIRUB SERPL-MCNC: 0.4 MG/DL (ref 0.2–1)
BUN SERPL-MCNC: 16 MG/DL (ref 6–20)
BUN/CREAT SERPL: 21 (ref 12–20)
CALCIUM SERPL-MCNC: 7.9 MG/DL (ref 8.5–10.1)
CHLORIDE SERPL-SCNC: 111 MMOL/L (ref 97–108)
CO2 SERPL-SCNC: 23 MMOL/L (ref 21–32)
CREAT SERPL-MCNC: 0.77 MG/DL (ref 0.55–1.02)
ERYTHROCYTE [DISTWIDTH] IN BLOOD BY AUTOMATED COUNT: 13.4 % (ref 11.5–14.5)
GLOBULIN SER CALC-MCNC: 3 G/DL (ref 2–4)
GLUCOSE BLD STRIP.AUTO-MCNC: 156 MG/DL (ref 65–100)
GLUCOSE BLD STRIP.AUTO-MCNC: 174 MG/DL (ref 65–100)
GLUCOSE SERPL-MCNC: 122 MG/DL (ref 65–100)
HCT VFR BLD AUTO: 37.4 % (ref 35–47)
HGB BLD-MCNC: 12.1 G/DL (ref 11.5–16)
MAGNESIUM SERPL-MCNC: 1.7 MG/DL (ref 1.6–2.4)
MCH RBC QN AUTO: 30.9 PG (ref 26–34)
MCHC RBC AUTO-ENTMCNC: 32.4 G/DL (ref 30–36.5)
MCV RBC AUTO: 95.4 FL (ref 80–99)
NRBC # BLD: 0 K/UL (ref 0–0.01)
NRBC BLD-RTO: 0 PER 100 WBC
PHOSPHATE SERPL-MCNC: 3.2 MG/DL (ref 2.6–4.7)
PLATELET # BLD AUTO: 127 K/UL (ref 150–400)
PMV BLD AUTO: 12.1 FL (ref 8.9–12.9)
POTASSIUM SERPL-SCNC: 4 MMOL/L (ref 3.5–5.1)
PROT SERPL-MCNC: 6 G/DL (ref 6.4–8.2)
RBC # BLD AUTO: 3.92 M/UL (ref 3.8–5.2)
SERVICE CMNT-IMP: ABNORMAL
SERVICE CMNT-IMP: ABNORMAL
SODIUM SERPL-SCNC: 137 MMOL/L (ref 136–145)
WBC # BLD AUTO: 5.5 K/UL (ref 3.6–11)

## 2020-05-21 PROCEDURE — 96376 TX/PRO/DX INJ SAME DRUG ADON: CPT

## 2020-05-21 PROCEDURE — 97161 PT EVAL LOW COMPLEX 20 MIN: CPT

## 2020-05-21 PROCEDURE — 85027 COMPLETE CBC AUTOMATED: CPT

## 2020-05-21 PROCEDURE — 80053 COMPREHEN METABOLIC PANEL: CPT

## 2020-05-21 PROCEDURE — 96372 THER/PROPH/DIAG INJ SC/IM: CPT

## 2020-05-21 PROCEDURE — 74011250637 HC RX REV CODE- 250/637: Performed by: INTERNAL MEDICINE

## 2020-05-21 PROCEDURE — 36415 COLL VENOUS BLD VENIPUNCTURE: CPT

## 2020-05-21 PROCEDURE — 77030038269 HC DRN EXT URIN PURWCK BARD -A

## 2020-05-21 PROCEDURE — 74011000258 HC RX REV CODE- 258: Performed by: INTERNAL MEDICINE

## 2020-05-21 PROCEDURE — 99218 HC RM OBSERVATION: CPT

## 2020-05-21 PROCEDURE — 84100 ASSAY OF PHOSPHORUS: CPT

## 2020-05-21 PROCEDURE — 82962 GLUCOSE BLOOD TEST: CPT

## 2020-05-21 PROCEDURE — 83735 ASSAY OF MAGNESIUM: CPT

## 2020-05-21 PROCEDURE — 97116 GAIT TRAINING THERAPY: CPT

## 2020-05-21 PROCEDURE — 74011250636 HC RX REV CODE- 250/636: Performed by: INTERNAL MEDICINE

## 2020-05-21 RX ORDER — DEXTROSE 50 % IN WATER (D50W) INTRAVENOUS SYRINGE
12.5-25 AS NEEDED
Status: DISCONTINUED | OUTPATIENT
Start: 2020-05-21 | End: 2020-05-21 | Stop reason: HOSPADM

## 2020-05-21 RX ORDER — CEPHALEXIN 250 MG/1
500 CAPSULE ORAL EVERY 8 HOURS
Status: DISCONTINUED | OUTPATIENT
Start: 2020-05-22 | End: 2020-05-21 | Stop reason: HOSPADM

## 2020-05-21 RX ORDER — CEPHALEXIN 500 MG/1
500 CAPSULE ORAL 3 TIMES DAILY
Qty: 9 CAP | Refills: 0 | Status: SHIPPED | OUTPATIENT
Start: 2020-05-21 | End: 2020-05-24

## 2020-05-21 RX ADMIN — CLOPIDOGREL BISULFATE 75 MG: 75 TABLET ORAL at 08:00

## 2020-05-21 RX ADMIN — SODIUM CHLORIDE 75 ML/HR: 900 INJECTION, SOLUTION INTRAVENOUS at 02:16

## 2020-05-21 RX ADMIN — CEFTRIAXONE SODIUM 1 G: 1 INJECTION, POWDER, FOR SOLUTION INTRAMUSCULAR; INTRAVENOUS at 08:00

## 2020-05-21 RX ADMIN — LISINOPRIL 40 MG: 20 TABLET ORAL at 08:00

## 2020-05-21 RX ADMIN — ENOXAPARIN SODIUM 40 MG: 40 INJECTION SUBCUTANEOUS at 08:01

## 2020-05-21 RX ADMIN — METFORMIN HYDROCHLORIDE 500 MG: 500 TABLET ORAL at 07:51

## 2020-05-21 NOTE — PROGRESS NOTES
Bedside and Verbal shift change report given to Deonte Sneed RN (oncoming nurse) by Archie Son RN (offgoing nurse). Report included the following information SBAR, Kardex, Intake/Output, MAR and Recent Results.

## 2020-05-21 NOTE — PROGRESS NOTES
11:21 AM  RRAT:  15  Transition of Care:    1). Order received for RW. Spoke to pt and signed freedom of choice  2). Sent referral to [facility] accepted and delivered to pt's room, signed receipt and sent to [facility]  3).  Family will transport home once DME is received    Red Armenta

## 2020-05-21 NOTE — DIABETES MGMT
[facility]  CLINICAL NURSE SPECIALIST   Follow-up Progress Note    Presentation   Kaleb Tripathi is a 80 y.o. female admitted for further evaluation of fatigue and dizziness. PMH is constipation, diabetes, glaucoma, HTN, hyperparathyroidism, join pain, kidney stones, migraines, osteoporosis and CVA.       Patient has known Type 2 diabetes, diagnosed approximately 2010. Home diabetic medication regime is Metformin 500 mg twice a day. A1c 5/20/20: 6.8%. Consulted by Provider for advanced specialty nursing care related to inpatient diabetes management. Hyperglycemia management order set is in place. Subjective   I am good and I am going home today.     Objective   Physical exam    General Alert, oriented and in no acute distress. Conversant and cooperative  Vital Signs   Visit Vitals  /85 (BP 1 Location: Left arm, BP Patient Position: At rest)   Pulse 85   Temp 98.5 °F (36.9 °C)   Resp 18   Ht 4' 9\" (1.448 m)   Wt 44.8 kg (98 lb 12.3 oz)   SpO2 94%   BMI 21.37 kg/m²     Skin  Warm and dry  Heart   Regular rate and rhythm.  No murmurs, rubs or gallops  Lungs  Clear to auscultation without rales or rhonchi  Extremities No foot wounds    Laboratory  Recent Results (from the past 24 hour(s))   TROPONIN I    Collection Time: 05/20/20 10:11 AM   Result Value Ref Range    Troponin-I, Qt. <0.05 <0.05 ng/mL   GLUCOSE, POC    Collection Time: 05/20/20 12:21 PM   Result Value Ref Range    Glucose (POC) 126 (H) 65 - 100 mg/dL    Performed by Elke Munguia    ECHO ADULT COMPLETE    Collection Time: 05/20/20  2:46 PM   Result Value Ref Range    LA Volume 28.37 22 - 52 mL    Ao Root D 2.83 cm    LVIDd 3.35 (A) 3.9 - 5.3 cm    LVPWd 1.05 (A) 0.6 - 0.9 cm    LVIDs 1.70 cm    IVSd 1.13 (A) 0.6 - 0.9 cm    LVOT d 1.82 cm    LVOT Peak Velocity 70.72 cm/s    LVOT Peak Gradient 2.0 mmHg    RVIDd 3.35 cm    LA Vol 4C 26.82 22 - 52 mL    LA Vol 2C 24.42 22 - 52 mL    LV Mass .3 67 - 162 g    LV Mass AL Index 92.5 43 - 95 g/m2    RVSP 32.5 mmHg    Est. RA Pressure 3.0 mmHg    Triscuspid Valve Regurgitation Peak Gradient 29.5 mmHg    Pulmonic Valve Max Velocity 78.64 cm/s    TR Max Velocity 271.44 cm/s    LA Vol Index 21.29 16 - 28 ml/m2    PASP 32.5 mmHg    LA Vol Index 18.33 16 - 28 ml/m2    LA Vol Index 20.13 16 - 28 ml/m2    Left Ventricular Fractional Shortening by 2D 56.928744719 %    Left Ventricular End Diastolic Volume by Teichholz Method 4.15668599662485 mL    Left Ventricular End Systolic Volume by Teichholz Method 2.28076473594900 mL    Left Ventricular Stroke Volume by Teichholz Method 87.710219777 mL    PV peak gradient 2.5 mmHg   GLUCOSE, POC    Collection Time: 05/20/20  4:13 PM   Result Value Ref Range    Glucose (POC) 137 (H) 65 - 100 mg/dL    Performed by Bronson Pear    GLUCOSE, POC    Collection Time: 05/20/20  9:15 PM   Result Value Ref Range    Glucose (POC) 124 (H) 65 - 100 mg/dL    Performed by [facility] ([facility])    CBC W/O DIFF    Collection Time: 05/21/20 12:53 AM   Result Value Ref Range    WBC 5.5 3.6 - 11.0 K/uL    RBC 3.92 3.80 - 5.20 M/uL    HGB 12.1 11.5 - 16.0 g/dL    HCT 37.4 35.0 - 47.0 %    MCV 95.4 80.0 - 99.0 FL    MCH 30.9 26.0 - 34.0 PG    MCHC 32.4 30.0 - 36.5 g/dL    RDW 13.4 11.5 - 14.5 %    PLATELET 007 (L) 611 - 400 K/uL    MPV 12.1 8.9 - 12.9 FL    NRBC 0.0 0  WBC    ABSOLUTE NRBC 0.00 0.00 - 0.01 K/uL   MAGNESIUM    Collection Time: 05/21/20 12:53 AM   Result Value Ref Range    Magnesium 1.7 1.6 - 2.4 mg/dL   METABOLIC PANEL, COMPREHENSIVE    Collection Time: 05/21/20 12:53 AM   Result Value Ref Range    Sodium 137 136 - 145 mmol/L    Potassium 4.0 3.5 - 5.1 mmol/L    Chloride 111 (H) 97 - 108 mmol/L    CO2 23 21 - 32 mmol/L    Anion gap 3 (L) 5 - 15 mmol/L    Glucose 122 (H) 65 - 100 mg/dL    BUN 16 6 - 20 MG/DL    Creatinine 0.77 0.55 - 1.02 MG/DL    BUN/Creatinine ratio 21 (H) 12 - 20      GFR est AA >60 >60 ml/min/1.73m2    GFR est non-AA >60 >60 ml/min/1.73m2    Calcium 7.9 (L) 8.5 - 10.1 MG/DL    Bilirubin, total 0.4 0.2 - 1.0 MG/DL    ALT (SGPT) 15 12 - 78 U/L    AST (SGOT) 17 15 - 37 U/L    Alk. phosphatase 46 45 - 117 U/L    Protein, total 6.0 (L) 6.4 - 8.2 g/dL    Albumin 3.0 (L) 3.5 - 5.0 g/dL    Globulin 3.0 2.0 - 4.0 g/dL    A-G Ratio 1.0 (L) 1.1 - 2.2     PHOSPHORUS    Collection Time: 05/21/20 12:53 AM   Result Value Ref Range    Phosphorus 3.2 2.6 - 4.7 MG/DL   GLUCOSE, POC    Collection Time: 05/21/20  7:23 AM   Result Value Ref Range    Glucose (POC) 174 (H) 65 - 100 mg/dL    Performed by Tereza Albarran         Blood glucose pattern        Evaluation   This 80year old female with known diabetes has achieved inpatient blood glucose target of 140-180mg/dl. BG's have ranged 122 - 174 over the past 24 hours. Patient's home medication of Metformin 500 mg twice a day started with evening dose 5/20/20.         Basal insulin isn't in use.     Bolus insulin isn't in use. Patient is eating meals. Regular cardiac diet ordered.       Corrective insulin is in use. Patient has not required any corrective insulin over the past 24 hours.      Assessment and Plan   Nursing Diagnosis Risk for unstable blood glucose pattern   Nursing Intervention Domain 1329 Decision-making Support   Nursing Interventions Examined current inpatient diabetes control   Explored factors facilitating and impeding inpatient management  Identified self-management practices impeding diabetes control  Explored corrective strategies with patient and responsible inpatient provider   Informed patient of rational for basal bolus insulin strategy while hospitalized       Recommendations     Continue current diabetes medication regime of Metformin 500 mg twice daily and corrective insulin.        Use Subcutaneous Insulin Order set (1935):  Corrective insulin  Correctional Scale for Normal Sensitivity     200-249- 2units Humalog  [id number] Humalog  [id number] Humalog  [id number] Humalog  Over 400- 10units Humalog     Do NOT hold for NPO; give in addition to meal time insulin dose.     If patient does not eat, -give correction dose only. For Discharge:  Resume Metformin 500 mg twice daily.        Consider referrals     [x]? ?        Diabetes Self-Management Training through [facility] (Phone [phone] WG schedule appointment)    Time Spent     Total time spent with patient: 15 Minutes   I personally reviewed chart, notes, data and current medications in the medical record. I have personally examined and treated the patient at bedside during this period.      Joe Bernal, CNS  Access via [employer]

## 2020-05-21 NOTE — DISCHARGE INSTRUCTIONS
Patient Discharge Instructions    Marcos Fairchild / [id number] : 1933    Admitted 2020 Discharged: 2020     Primary Diagnoses  Problem List as of 2020 Date Reviewed: 2020           Glaucoma   Hyperlipemia   Hyperparathyroidism ([facility] [address]   Joint pain   Osteoporosis   UTI (urinary tract infection)   Dizzy   HTN (hypertension)   Diabetes ([facility] [address]          Take Home Medications     · It is important that you take the medication exactly as they are prescribed. · Keep your medication in the bottles provided by the pharmacist and keep a list of the medication names, dosages, and times to be taken in your wallet. · Do not take other medications without consulting your doctor. What to do at Home    Recommended diet: Diabetic Diet    Recommended activity: Activity as tolerated    If you experience worse symptoms, please follow up with your PCP. Follow-up with your PCP in a few weeks        Information obtained by :  I understand that if any problems occur once I am at home I am to contact my physician. I understand and acknowledge receipt of the instructions indicated above.                                                                                                                                            Physician's or R.N.'s Signature                                                                  Date/Time                                                                                                                                              Patient or Representative Signature                                                          Date/Time

## 2020-05-21 NOTE — ROUTINE PROCESS
I have reviewed discharge instructions with the patient. The patient verbalized understanding. Paper copy of AVS along with one prescription given to patient. PIV removed with tip intact. Patient dressed self, belongings were gathered. Patient to leave via wheelchair to personal vehicle with daughter.

## 2020-05-21 NOTE — PROGRESS NOTES
Problem: Mobility Impaired (Adult and Pediatric)  Goal: *Acute Goals and Plan of Care (Insert Text)  Description: FUNCTIONAL STATUS PRIOR TO ADMISSION: Patient was independent and active without use of DME.    HOME SUPPORT PRIOR TO ADMISSION: The patient lived with Maria G Garcia but did not require assist.    Physical Therapy Goals  Initiated 5/21/2020  1. Patient will move from supine to sit and sit to supine  in bed with modified independence within 7 day(s). 2.  Patient will transfer from bed to chair and chair to bed with modified independence using the least restrictive device within 7 day(s). 3.  Patient will perform sit to stand with modified independence within 7 day(s). 4.  Patient will ambulate with modified independence for 150 feet with the least restrictive device within 7 day(s). 5.  Patient will ascend/descend 4 stairs with 2 handrail(s) with modified independence within 7 day(s). Outcome: Progressing Towards Goal  Note:   PHYSICAL THERAPY EVALUATION  Patient: Darrian Borja (89 y.o. female)  Date: 5/21/2020  Primary Diagnosis: UTI (urinary tract infection) [N39.0]        Precautions:   Fall      ASSESSMENT  Based on the objective data described below, the patient presents with decreased balance and dizziness following admission for presyncope. Patient today has a drop in blood pressure that is within the parameters for orthostatic hypotension however she is asymptomatic. Heart rate with activity 122 bpm.  Patient overall CGA using a RW. This is a new assistive device for the patient, she would like one for return to home but she is declining home health for training with RW. Benefits of therapy discussed with patient and she continues to decline. .    Current Level of Function Impacting Discharge (mobility/balance): mod I for bed mobility, sba for transfers, CGA for ambulation    Functional Outcome Measure:   The patient scored Total: 90/100 on the Barthel Index which is indicative of 10% impaired ability to care for basic self needs/dependency on others. Other factors to consider for discharge: lives with daughter     Patient will benefit from skilled therapy intervention to address the above noted impairments. PLAN :  Recommendations and Planned Interventions: bed mobility training, transfer training, gait training, therapeutic exercises, and therapeutic activities      Frequency/Duration: Patient will be followed by physical therapy:  5 times a week to address goals. Recommendation for discharge: (in order for the patient to meet his/her long term goals)  Physical therapy at least 2 days/week in the home - patient is currently declining due to \"not wanting people in her house\"    This discharge recommendation:  A follow-up discussion with the attending provider and/or case management is planned    IF patient discharges home will need the following DME: rolling walker         SUBJECTIVE:   Patient stated my house is not big enough for a therapist to be in the hallway with me.     OBJECTIVE DATA SUMMARY:   HISTORY:    Past Medical History:   Diagnosis Date    Constipation     Diabetes ([facility] [address]     Glaucoma     Hyperlipemia     Hyperparathyroidism ([facility] [address]     Hypertension     Joint pain     Kidney stone     Migraine     Osteoporosis     TIA (transient ischemic attack)    No past surgical history on file.     Personal factors and/or comorbidities impacting plan of care: see above    Home Situation  Home Environment: Private residence  # Steps to Enter: 6  Rails to Enter: Yes  Hand Rails : Right  One/Two Story Residence: One story  Living Alone: No  Support Systems: Family member(s), Child(eulalia)(daughter)  Patient Expects to be Discharged to[de-identified] Private residence  Current DME Used/Available at Home: None    EXAMINATION/PRESENTATION/DECISION MAKING:   Patient Vitals for the past 8 hrs:   Position Pulse BP   05/21/20 1015 Post activity (!) 110 176/86   05/21/20 1010 During activity (!) 122 -- 20 1008 standing (!) 115 126/74   20 1005 sitting 99 139/80   20 1002 supine 83 149/82        Critical Behavior:  Neurologic State: Alert  Orientation Level: Oriented X4  Cognition: Follows commands     Hearing: Auditory  Auditory Impairment: None  Skin:  all exposed intact  Edema: none noted  Range Of Motion:  AROM: Within functional limits           PROM: Within functional limits           Strength:    Strength: Within functional limits                    Tone & Sensation:   Tone: Normal              Sensation: Intact               Coordination:  Coordination: Within functional limits  Vision:      Functional Mobility:  Bed Mobility:  Rolling: Modified independent  Supine to Sit: Modified independent        Transfers:  Sit to Stand: Supervision  Stand to Sit: Supervision        Bed to Chair: Contact guard assistance              Balance:      Ambulation/Gait Training:  Distance (ft): 200 Feet (ft)  Assistive Device: Walker, rolling;Gait belt  Ambulation - Level of Assistance: Contact guard assistance     Gait Description (WDL): Exceptions to WDL                                          Stairs: Therapeutic Exercises:       Functional Measure:  Barthel Index:    Bathin  Bladder: 10  Bowels: 10  Groomin  Dressing: 10  Feeding: 10  Mobility: 10  Stairs: 5  Toilet Use: 10  Transfer (Bed to Chair and Back): 15  Total: 90/100       The Barthel ADL Index: Guidelines  1. The index should be used as a record of what a patient does, not as a record of what a patient could do. 2. The main aim is to establish degree of independence from any help, physical or verbal, however minor and for whatever reason. 3. The need for supervision renders the patient not independent. 4. A patient's performance should be established using the best available evidence. Asking the patient, friends/relatives and nurses are the usual sources, but direct observation and common sense are also important. However direct testing is not needed. 5. Usually the patient's performance over the preceding 24-48 hours is important, but occasionally longer periods will be relevant. 6. Middle categories imply that the patient supplies over 50 per cent of the effort. 7. Use of aids to be independent is allowed. Lynder Boop., Barthel, D.W. (0148). Functional evaluation: the Barthel Index. [address] [facility] (14)2. JESÚS Jimenez, [employer]., Oziel Calles., [facility], [address] [facility] (1999). Measuring the change indisability after inpatient rehabilitation; comparison of the responsiveness of the Barthel Index and Functional [place] Measure. Journal of Neurology, Neurosurgery, and Psychiatry, 66(4), 098-814. ALMA Evans, VINITA Hayward, & Tommy Leon M.A. (2004.) Assessment of post-stroke quality of life in cost-effectiveness studies: The usefulness of the Barthel Index and the EuroQoL-5D. Quality of Life Research, 15, 160-75        Physical Therapy Evaluation Charge Determination   History Examination Presentation Decision-Making   HIGH Complexity :3+ comorbidities / personal factors will impact the outcome/ POC  MEDIUM Complexity : 3 Standardized tests and measures addressing body structure, function, activity limitation and / or participation in recreation  LOW Complexity : Stable, uncomplicated  Other outcome measures barthel index  LOW       Based on the above components, the patient evaluation is determined to be of the following complexity level: LOW     Pain Rating:  None reported    Activity Tolerance:   Good  Please refer to the flowsheet for vital signs taken during this treatment. After treatment patient left in no apparent distress:   Sitting in chair and Call bell within reach    COMMUNICATION/EDUCATION:   The patients plan of care was discussed with: Occupational therapist and Registered nurse.      Fall prevention education was provided and the patient/caregiver indicated understanding., Patient/family have participated as able in goal setting and plan of care. , and Patient/family agree to work toward stated goals and plan of care.     Thank you for this referral.  Fernandez Fernando, PT, DPT   Time Calculation: 11 mins

## 2020-05-21 NOTE — PROGRESS NOTES
CMS Note  5/21/2020    Patient received the Observation letter, patient did not want to sign for CMS. Patient was given a copy for their record.   Shantel Webb CMS

## 2020-05-21 NOTE — PROGRESS NOTES
[facility] Dosing Services: 5/21/20      The following medication: Keflex was automatically dose-adjusted per [facility] P&T Committee Protocol, with respect to renal function. Consult provided for this   80 y.o. , female , for the indication of UTI. Dosage changed to:  Keflex 500mg PO TID    Pt Weight:   Wt Readings from Last 1 Encounters:   05/20/20 44.8 kg (98 lb 12.3 oz)         Previous Regimen Keflex 500mg PO QID   Serum Creatinine Lab Results   Component Value Date/Time    Creatinine 0.77 05/21/2020 12:53 AM       Creatinine Clearance Estimated Creatinine Clearance: 37.1 mL/min (based on SCr of 0.77 mg/dL). BUN Lab Results   Component Value Date/Time    BUN 16 05/21/2020 12:53 AM           Additional notes:      Pharmacy to continue to monitor patient daily. Will make dosage adjustments based upon changing renal function. Signed Leeroy MATTA  [address] [place] [address] information:  [phone]

## 2020-05-21 NOTE — PROGRESS NOTES
[facility]    Medical Progress Note      NAME: Mone Armenta   :  1933  MRM:  [id number]    Date/Time of service 2020  8:48 AM          Assessment and Plan:     UTI (urinary tract infection) - POA, no SIRS criteria. Start ceftriaxone. No cx was sent despite positive UA. I will try to have that done. DC home on Keflex     Dizzy - Symptoms resolved. Unclear etiology. Likley hypotension, DDX, hypoglycemia, arrhythmia etc.  Check orthostatics. Normal ECHO. Hydrated. Continue all usual meds     Diabetes type 2 with vascular complications - Diabetic diet and counseling. SSI per protocol. Continue home metformin. A1c 6.8.     HTN (hypertension) - continue lisinopril high dose. ACE can exacerbate hypotension, BB should be considered given hx of CVA     Hx completed stroke - Noted on MRI 7 years ago. No current symptoms. Continue Plavix      Glaucoma - No drops listed     Hyperlipemia - LDL 92 without statin      Hyperparathyroidism - No meds     Joint pain / Osteoporosis - Tylneol prn         Subjective:     Chief Complaint:  Feels fine and wants to go home    ROS:  (bold if positive, if negative)    Tolerating PT  Tolerating Diet        Objective:     Last 24hrs VS reviewed since prior progress note.  Most recent are:    Visit Vitals  /85 (BP 1 Location: Left arm, BP Patient Position: At rest)   Pulse 85   Temp 98.5 °F (36.9 °C)   Resp 18   Ht 4' 9\" (1.448 m)   Wt 44.8 kg (98 lb 12.3 oz)   SpO2 94%   BMI 21.37 kg/m²     SpO2 Readings from Last 6 Encounters:   20 94%   01/12/15 97%   14 98%            Intake/Output Summary (Last 24 hours) at 2020 0848  Last data filed at 2020 1712  Gross per 24 hour   Intake 1360 ml   Output 300 ml   Net 1060 ml        Physical Exam:    Gen:  Thin, in no acute distress  HEENT:  Pink conjunctivae, PERRL, hearing intact to voice, moist mucous membranes  Neck:  Supple, without masses, thyroid non-tender  Resp:  No accessory muscle use, clear breath sounds without wheezes rales or rhonchi  Card:  No murmurs, normal S1, S2 without thrills, bruits or peripheral edema  Abd:  Soft, non-tender, non-distended, normoactive bowel sounds are present, no mass  Lymph:  No cervical or inguinal adenopathy  Musc:  No cyanosis or clubbing  Skin:  No rashes or ulcers, skin turgor is good  Neuro:  Cranial nerves are grossly intact, mild motor weakness, follows commands   Psych:   Moderate insight, oriented to person, place and time, alert    Telemetry reviewed:   normal sinus rhythm  __________________________________________________________________  Medications Reviewed: (see below)  Medications:     Current Facility-Administered Medications   Medication Dose Route Frequency    dextrose (D50W) injection syrg 12.5-25 g  12.5-25 g IntraVENous PRN    clopidogreL (PLAVIX) tablet 75 mg  75 mg Oral DAILY    metFORMIN (GLUCOPHAGE) tablet 500 mg  500 mg Oral BID WITH MEALS    lisinopriL (PRINIVIL, ZESTRIL) tablet 40 mg  40 mg Oral DAILY    zolpidem (AMBIEN) tablet 5 mg  5 mg Oral QHS PRN    glucose chewable tablet 16 g  4 Tab Oral PRN    glucagon (GLUCAGEN) injection 1 mg  1 mg IntraMUSCular PRN    0.9% sodium chloride infusion  75 mL/hr IntraVENous CONTINUOUS    acetaminophen (TYLENOL) tablet 650 mg  650 mg Oral Q4H PRN    diphenhydrAMINE (BENADRYL) capsule 25 mg  25 mg Oral Q4H PRN    enoxaparin (LOVENOX) injection 40 mg  40 mg SubCUTAneous DAILY    insulin lispro (HUMALOG) injection   SubCUTAneous AC&HS    cefTRIAXone (ROCEPHIN) 1 g in 0.9% sodium chloride (MBP/ADV) 50 mL  1 g IntraVENous Q24H        Lab Data Reviewed: (see below)  Lab Review:     Recent Labs     05/21/20 0053 05/20/20  0742   WBC 5.5 6.0   HGB 12.1 13.6   HCT 37.4 40.6   * 157     Recent Labs     05/21/20 0053 05/20/20  0742    139   K 4.0 3.9   * 103   CO2 23 28   * 137*   BUN 16 12   CREA 0.77 0.77   CA 7.9* 8.8   MG 1.7 1.9   PHOS 3.2 --    ALB 3.0* 3.8   TBILI 0.4 0.6   SGOT 17 13*   ALT 15 17     Lab Results   Component Value Date/Time    Glucose (POC) 174 (H) 05/21/2020 07:23 AM    Glucose (POC) 124 (H) 05/20/2020 09:15 PM    Glucose (POC) 137 (H) 05/20/2020 04:13 PM    Glucose (POC) 126 (H) 05/20/2020 12:21 PM    Glucose (POC) 145 (H) 12/29/2014 11:39 AM     No results for input(s): PH, PCO2, PO2, HCO3, FIO2 in the last 72 hours. No results for input(s): INR, INREXT in the last 72 hours. All Micro Results     None          Other pertinent lab: none    Total time spent with patient: 39 Minutes I personally reviewed chart, notes, data and current medications in the medical record. I have personally examined and treated the patient at bedside during this period.                  Care Plan discussed with: Patient, Care Manager, Nursing Staff and >50% of time spent in counseling and coordination of care    Discussed:  Care Plan and D/C Planning    Prophylaxis:  H2B/PPI    Disposition:  Home w/Family           ___________________________________________________    Attending Physician: Marlin Hurtado MD

## 2020-05-21 NOTE — DISCHARGE SUMMARY
Physician Discharge Summary     Patient ID:  Bernie Raya  [id number]  80 y.o.  6/23/1933    Admit date: 5/20/2020    Discharge date of service and time: 5/21/2020    Admission Diagnoses: UTI (urinary tract infection) [N39.0]    Discharge Diagnoses:    Principal Diagnosis   <principal problem not specified>                                             Hospital Course and other diagnoses  UTI (urinary tract infection) - POA, no SIRS criteria.  Start ceftriaxone. No cx was sent despite positive UA. I will try to have that done. DC home on Keflex     Dizzy - Symptoms resolved.  Unclear etiology.  Likley hypotension, DDX, hypoglycemia, arrhythmia etc.  Check orthostatics. Normal ECHO.  Hydrated.  Continue all usual meds     Diabetes type 2 with vascular complications - Diabetic diet and counseling.  SSI per protocol.  Continue home metformin. A1c 6.8.     HTN (hypertension) - continue lisinopril high dose.  ACE can exacerbate hypotension, BB should be considered given hx of CVA     Hx completed stroke - Noted on MRI 7 years ago.  No current symptoms. Continue Plavix      Glaucoma - No drops listed     Hyperlipemia - LDL 92 without statin      Hyperparathyroidism - No meds     Joint pain / Osteoporosis - Tylneol prn     PCP: Trent Ireland MD    Consults: None    Significant Diagnostic Studies: See Hospital Course    Discharged home in improved condition.     Discharge Exam:  /85 (BP 1 Location: Left arm, BP Patient Position: At rest)   Pulse 85   Temp 98.5 °F (36.9 °C)   Resp 18   Ht 4' 9\" (1.448 m)   Wt 44.8 kg (98 lb 12.3 oz)   SpO2 94%   BMI 21.37 kg/m²      Gen:  Thin, in no acute distress  HEENT:  Pink conjunctivae, PERRL, hearing intact to voice, moist mucous membranes  Neck:  Supple, without masses, thyroid non-tender  Resp:  No accessory muscle use, clear breath sounds without wheezes rales or rhonchi  Card:  No murmurs, normal S1, S2 without thrills, bruits or peripheral edema  Abd:  Soft, non-tender, non-distended, normoactive bowel sounds are present, no mass  Lymph:  No cervical or inguinal adenopathy  Musc:  No cyanosis or clubbing  Skin:  No rashes or ulcers, skin turgor is good  Neuro:  Cranial nerves are grossly intact, mild motor weakness, follows commands   Psych: Moderate insight, oriented to person, place and time, alert    Patient Instructions:   Current Discharge Medication List      START taking these medications    Details   cephALEXin (KEFLEX) 500 mg capsule Take 1 Cap by mouth three (3) times daily for 3 days. Qty: 9 Cap, Refills: 0         CONTINUE these medications which have NOT CHANGED    Details   metFORMIN (GLUCOPHAGE) 500 mg tablet Take 500 mg by mouth two (2) times daily (with meals). miscellaneous medical supply (BLOOD PRESSURE CUFF) misc by Does Not Apply route. clopidogrel (PLAVIX) 75 mg tablet Take 1 tablet by mouth daily. Qty: 30 tablet, Refills: 0      lisinopril (PRINIVIL, ZESTRIL) 40 mg tablet Take 40 mg by mouth daily. butalbital-aspirin-caffeine (FIORINAL) capsule Take 1 capsule by mouth every eight (8) hours as needed for Headache. cholecalciferol, VITAMIN D3, (VITAMIN D3) 5,000 unit tab tablet Take 5,000 Units by mouth every seven (7) days. On Thursdays           Activity: Activity as tolerated  Diet: Diabetic Diet  Wound Care: None needed    Follow-up with your PCP in a few weeks.   Follow-up tests/labs - none    Signed:  Maday Barksdale MD  5/21/2020  8:56 AM

## 2020-05-21 NOTE — PROGRESS NOTES
Orders received, chart reviewed and patient evaluated by physical therapy. Pending progression with skilled acute physical therapy, recommend:  Patient was orthostatic with activity but asymptomatic. Patient requesting RW which she would benefit from home health  Physical therapy at least 2 days/week in the home - patient is currently declining any outside PT    Recommend with nursing patient to complete as able in order to maintain strength, endurance and independence: OOB to chair 3x/day with RW and ambulating with CGA. Thank you for your assistance. Full evaluation to follow.    Jean Paul Murphy PT,DPT,NCS

## 2020-05-22 ENCOUNTER — PATIENT OUTREACH (OUTPATIENT)
Dept: INTERNAL MEDICINE CLINIC | Age: 85
End: 2020-05-22

## 2020-05-22 NOTE — PROGRESS NOTES
Patient contacted regarding recent discharge and COVID-19 risk. Discussed COVID-19 related testing which was not done at this time. Test results were not done. Patient informed of results, if available? [facility] Transition Nurse/ Ambulatory Care Manager contacted the patient by telephone to perform post discharge assessment. Verified name and  with patient as identifiers. Patient has following risk factors of: diabetes and HTN. CTN/ACM reviewed discharge instructions, medical action plan and red flags related to discharge diagnosis. Reviewed and educated them on any new and changed medications related to discharge diagnosis. Advised obtaining a 90-day supply of all daily and as-needed medications. Education provided regarding infection prevention, and signs and symptoms of COVID-19 and when to seek medical attention with patient who verbalized understanding. Discussed exposure protocols and quarantine from [address] you at higher risk for severe illness  and given an opportunity for questions and concerns. The patient agrees to contact the COVID-19 hotline [phone] or PCP office for questions related to their healthcare. CTN/ACM provided contact information for future reference. From CDC: Are you at higher risk for severe illness?  Wash your hands often.  Avoid close contact (6 feet, which is about two arm lengths) with people who are sick.  Put distance between yourself and other people if COVID-19 is spreading in your community.  Clean and disinfect frequently touched surfaces.  Avoid all cruise travel and non-essential air travel.  Call your healthcare professional if you have concerns about COVID-19 and your underlying condition or if you are sick.     For more information on steps you can take to protect yourself, see CDC's How to Protect Yourself      Patient/family/caregiver given information for Abdirahman Delgado and agrees to enroll no  Patient's preferred e-mail:  NA  Patient's preferred phone number: NA  Based on Loop alert triggers, patient will be contacted by nurse care manager for worsening symptoms. Plan for follow-up call in 7-14 days based on severity of symptoms and risk factors. Patient reports she is doing well. Reports she had a question if metformin and keflex can be taken together. States when she took medication last night, she became dizzy. States she received return call from Dr. Haider Lee office. Ok to take. CTN advised abx was given in hospital intravenously. Overall, patient reports she is doing well. Denies dizziness, pain.

## 2020-06-05 ENCOUNTER — PATIENT OUTREACH (OUTPATIENT)
Dept: INTERNAL MEDICINE CLINIC | Age: 85
End: 2020-06-05

## 2020-06-05 NOTE — PROGRESS NOTES
Patient resolved from Transition of Care episode on 6/5/2020. Discussed COVID-19 related testing which was not done at this time. Test results were not done. Patient/family has been provided the following resources and education related to COVID-19:                         Signs, symptoms and red flags related to COVID-19            CDC exposure and quarantine guidelines            [facility] exposure contact - [phone]            Contact for their local Department of Health                 Patient currently reports that the following symptoms have improved:  patient denies any current symptoms. No further outreach scheduled with this CTN. Episode of Care resolved. Patient has this CTN contact information if future needs arise. Follow up- patient has appointment with her PCP on 6/15. She has initial [facility] appt last week.

## 2021-05-25 ENCOUNTER — APPOINTMENT (OUTPATIENT)
Dept: GENERAL RADIOLOGY | Age: 86
End: 2021-05-25
Attending: EMERGENCY MEDICINE
Payer: MEDICARE

## 2021-05-25 ENCOUNTER — HOSPITAL ENCOUNTER (EMERGENCY)
Age: 86
Discharge: HOME | End: 2021-05-25
Attending: EMERGENCY MEDICINE
Payer: MEDICARE

## 2021-05-25 VITALS
BODY MASS INDEX: 20.49 KG/M2 | SYSTOLIC BLOOD PRESSURE: 186 MMHG | DIASTOLIC BLOOD PRESSURE: 88 MMHG | WEIGHT: 95 LBS | OXYGEN SATURATION: 98 % | TEMPERATURE: 97.9 F | HEART RATE: 72 BPM | RESPIRATION RATE: 18 BRPM | HEIGHT: 57 IN

## 2021-05-25 DIAGNOSIS — R06.00 DYSPNEA, UNSPECIFIED TYPE: Primary | ICD-10-CM

## 2021-05-25 LAB
ALBUMIN SERPL-MCNC: 3.7 G/DL (ref 3.5–5)
ALBUMIN/GLOB SERPL: 1.3 {RATIO} (ref 1.1–2.2)
ALP SERPL-CCNC: 49 U/L (ref 45–117)
ALT SERPL-CCNC: 21 U/L (ref 12–78)
ANION GAP SERPL CALC-SCNC: 6 MMOL/L (ref 5–15)
AST SERPL-CCNC: 18 U/L (ref 15–37)
ATRIAL RATE: 78 BPM
BASOPHILS # BLD: 0 K/UL (ref 0–0.1)
BASOPHILS NFR BLD: 1 % (ref 0–1)
BILIRUB SERPL-MCNC: 0.5 MG/DL (ref 0.2–1)
BNP SERPL-MCNC: 125 PG/ML
BUN SERPL-MCNC: 22 MG/DL (ref 6–20)
BUN/CREAT SERPL: 28 (ref 12–20)
CALCIUM SERPL-MCNC: 8.3 MG/DL (ref 8.5–10.1)
CALCULATED P AXIS, ECG09: 17 DEGREES
CALCULATED R AXIS, ECG10: -77 DEGREES
CALCULATED T AXIS, ECG11: -32 DEGREES
CHLORIDE SERPL-SCNC: 103 MMOL/L (ref 97–108)
CO2 SERPL-SCNC: 28 MMOL/L (ref 21–32)
COMMENT, HOLDF: NORMAL
CREAT SERPL-MCNC: 0.78 MG/DL (ref 0.55–1.02)
DIAGNOSIS, 93000: NORMAL
DIFFERENTIAL METHOD BLD: ABNORMAL
EOSINOPHIL # BLD: 0 K/UL (ref 0–0.4)
EOSINOPHIL NFR BLD: 0 % (ref 0–7)
ERYTHROCYTE [DISTWIDTH] IN BLOOD BY AUTOMATED COUNT: 14 % (ref 11.5–14.5)
GLOBULIN SER CALC-MCNC: 2.9 G/DL (ref 2–4)
GLUCOSE SERPL-MCNC: 145 MG/DL (ref 65–100)
HCT VFR BLD AUTO: 37.4 % (ref 35–47)
HGB BLD-MCNC: 12.3 G/DL (ref 11.5–16)
IMM GRANULOCYTES # BLD AUTO: 0 K/UL (ref 0–0.04)
IMM GRANULOCYTES NFR BLD AUTO: 0 % (ref 0–0.5)
LYMPHOCYTES # BLD: 1.3 K/UL (ref 0.8–3.5)
LYMPHOCYTES NFR BLD: 24 % (ref 12–49)
MCH RBC QN AUTO: 31.1 PG (ref 26–34)
MCHC RBC AUTO-ENTMCNC: 32.9 G/DL (ref 30–36.5)
MCV RBC AUTO: 94.4 FL (ref 80–99)
MONOCYTES # BLD: 0.3 K/UL (ref 0–1)
MONOCYTES NFR BLD: 7 % (ref 5–13)
NEUTS SEG # BLD: 3.6 K/UL (ref 1.8–8)
NEUTS SEG NFR BLD: 68 % (ref 32–75)
NRBC # BLD: 0 K/UL (ref 0–0.01)
NRBC BLD-RTO: 0 PER 100 WBC
P-R INTERVAL, ECG05: 116 MS
PLATELET # BLD AUTO: 131 K/UL (ref 150–400)
PMV BLD AUTO: 12 FL (ref 8.9–12.9)
POTASSIUM SERPL-SCNC: 3.8 MMOL/L (ref 3.5–5.1)
PROT SERPL-MCNC: 6.6 G/DL (ref 6.4–8.2)
Q-T INTERVAL, ECG07: 398 MS
QRS DURATION, ECG06: 118 MS
QTC CALCULATION (BEZET), ECG08: 453 MS
RBC # BLD AUTO: 3.96 M/UL (ref 3.8–5.2)
SAMPLES BEING HELD,HOLD: NORMAL
SODIUM SERPL-SCNC: 137 MMOL/L (ref 136–145)
TROPONIN I SERPL-MCNC: <0.05 NG/ML
TROPONIN I SERPL-MCNC: <0.05 NG/ML
VENTRICULAR RATE, ECG03: 78 BPM
WBC # BLD AUTO: 5.2 K/UL (ref 3.6–11)

## 2021-05-25 PROCEDURE — 83880 ASSAY OF NATRIURETIC PEPTIDE: CPT

## 2021-05-25 PROCEDURE — 85025 COMPLETE CBC W/AUTO DIFF WBC: CPT

## 2021-05-25 PROCEDURE — 80053 COMPREHEN METABOLIC PANEL: CPT

## 2021-05-25 PROCEDURE — 36415 COLL VENOUS BLD VENIPUNCTURE: CPT

## 2021-05-25 PROCEDURE — 93005 ELECTROCARDIOGRAM TRACING: CPT

## 2021-05-25 PROCEDURE — 71046 X-RAY EXAM CHEST 2 VIEWS: CPT

## 2021-05-25 PROCEDURE — 84484 ASSAY OF TROPONIN QUANT: CPT

## 2021-05-25 PROCEDURE — 99283 EMERGENCY DEPT VISIT LOW MDM: CPT

## 2021-05-25 NOTE — ED PROVIDER NOTES
The history is provided by the patient. Shortness of Breath  This is a new problem. The problem occurs rarely. The current episode started less than 1 hour ago. The problem has not changed since onset. Pertinent negatives include no fever, no headaches, no coryza, no rhinorrhea, no sore throat, no swollen glands, no ear pain, no neck pain, no cough, no sputum production, no hemoptysis, no wheezing, no PND, no orthopnea, no chest pain, no syncope, no vomiting, no abdominal pain, no rash, no leg pain, no leg swelling and no claudication. The problem's precipitants include emotional upset. She has tried nothing for the symptoms. The treatment provided no relief. She has had prior ED visits. She has had no prior ICU admissions. Associated medical issues do not include asthma, COPD, pneumonia, chronic lung disease, PE, CAD, heart failure, past MI, DVT or recent surgery. Past Medical History:   Diagnosis Date    Constipation     Diabetes ([facility] [address]     Glaucoma     Hyperlipemia     Hyperparathyroidism ([facility] [address] [facility]     Hypertension     Joint pain     Kidney stone     Migraine     Osteoporosis     TIA (transient ischemic attack)        No past surgical history on file.       Family History:   Problem Relation Age of Onset    Cancer Mother         bladder       Social History     Socioeconomic History    Marital status:      Spouse name: Not on file    Number of children: Not on file    Years of education: Not on file    Highest education level: Not on file   Occupational History    Not on file   Tobacco Use    Smoking status: Former Smoker    Smokeless tobacco: Never Used   Substance and Sexual Activity    Alcohol use: No    Drug use: Not on file    Sexual activity: Not on file   Other Topics Concern    Not on file   Social History Narrative    Not on file     Social Determinants of Health     Financial Resource Strain:     Difficulty of Paying Living Expenses:    Food Insecurity:     Worried About Running Out of Food in the Last Year:     Hipolito of Food in the Last Year:    Transportation Needs:     Lack of Transportation (Medical):  Lack of Transportation (Non-Medical):    Physical Activity:     Days of Exercise per Week:     Minutes of Exercise per Session:    Stress:     Feeling of Stress :    Social Connections:     Frequency of Communication with Friends and Family:     Frequency of Social Gatherings with Friends and Family:     Attends [employer] Services:     Active Member of Clubs or Organizations:     Attends Club or Organization Meetings:     Marital Status:    Intimate Partner Violence:     Fear of Current or Ex-Partner:     Emotionally Abused:     Physically Abused:     Sexually Abused: ALLERGIES: Codeine, Contrast dye [iodine], Seafood, and Zocor [simvastatin]    Review of Systems   Constitutional: Negative for activity change, chills and fever. HENT: Negative for ear pain, nosebleeds, rhinorrhea, sore throat, trouble swallowing and voice change. Eyes: Negative for visual disturbance. Respiratory: Positive for shortness of breath. Negative for cough, hemoptysis, sputum production and wheezing. Cardiovascular: Negative for chest pain, palpitations, orthopnea, claudication, leg swelling, syncope and PND. Gastrointestinal: Negative for abdominal pain, constipation, diarrhea, nausea and vomiting. Genitourinary: Negative for difficulty urinating, dysuria, hematuria and urgency. Musculoskeletal: Negative for back pain, neck pain and neck stiffness. Skin: Negative for color change and rash. Allergic/Immunologic: Negative for immunocompromised state. Neurological: Negative for dizziness, seizures, syncope, weakness, light-headedness, numbness and headaches. Psychiatric/Behavioral: Negative for behavioral problems, confusion, hallucinations, self-injury and suicidal ideas. The patient is nervous/anxious.         Vitals:    05/25/21 0727   BP: (!) 164/84   Pulse: 81   Resp: 16   Temp: 97.5 °F (36.4 °C)   SpO2: 98%   Weight: 43.1 kg (95 lb)   Height: 4' 9\" (1.448 m)            Physical Exam  Vitals and nursing note reviewed. Constitutional:       General: She is not in acute distress. Appearance: She is well-developed. She is not diaphoretic. HENT:      Head: Normocephalic and atraumatic. Eyes:      Pupils: Pupils are equal, round, and reactive to light. Cardiovascular:      Rate and Rhythm: Normal rate and regular rhythm. Heart sounds: Normal heart sounds. No murmur heard. No friction rub. No gallop. Pulmonary:      Effort: Pulmonary effort is normal. No respiratory distress. Breath sounds: Normal breath sounds. No wheezing. Abdominal:      General: Bowel sounds are normal. There is no distension. Palpations: Abdomen is soft. Tenderness: There is no abdominal tenderness. There is no guarding or rebound. Musculoskeletal:         General: Normal range of motion. Cervical back: Normal range of motion and neck supple. Skin:     General: Skin is warm. Findings: No rash. Neurological:      Mental Status: She is alert and oriented to person, place, and time. Psychiatric:         Behavior: Behavior normal.         Thought Content: Thought content normal.         Judgment: Judgment normal.          MDM     This is an 49-year-old female with past medical history, review of systems, physical exam as above, presenting with complaints of shortness of breath. Patient states shortness of breath began while she was having a confrontation with her daughter. She states she is having to evict her daughter for multiple issues. She states the police were summoned. Patient denies symptoms prior to this episode, denying cough or fevers. She denies a history of pulmonary or cardiac disease.   She denies a history of coronary catheterization or stenting, however states her cardiologist has placed her on Plavix \"for prophylaxis\". She states at the time of evaluation shortness of breath is not improved. Physical exam is remarkable for cachectic elderly female, in no acute distress noted to be mildly hypertensive, afebrile without tachycardia, satting well on room air. She has clear breath sounds to auscultation, regular rate and rhythm without murmurs gallops rubs, no JVD nor pedal edema. Suspect symptoms are secondary to emotional distress, however given age and risk factors will obtain CMP, CBC, EKG, chest x-ray, cardiac enzymes. We will reassess, and make a disposition. Procedures    Update:  Patient remains in no acute distress, negative troponins x2. Likely secondary to emotional upset, sats remain normal, hypertensive. Will discharge patient home with precautions for recorded blood pressures and follow-up with primary care, return precautions given.

## 2021-05-25 NOTE — ED TRIAGE NOTES
Patient brought in by EMS for SOB after having an argument with her daughter. On arrival patient is complaining of anxiety and seems to be upset. She is wanting to get checked out for her high BP and SOB. Patient denies chest pain and cough.

## 2021-09-02 ENCOUNTER — APPOINTMENT (OUTPATIENT)
Dept: GENERAL RADIOLOGY | Age: 86
End: 2021-09-02
Attending: EMERGENCY MEDICINE
Payer: MEDICARE

## 2021-09-02 ENCOUNTER — HOSPITAL ENCOUNTER (EMERGENCY)
Age: 86
Discharge: HOME | End: 2021-09-02
Attending: EMERGENCY MEDICINE
Payer: MEDICARE

## 2021-09-02 VITALS
HEART RATE: 89 BPM | BODY MASS INDEX: 19.91 KG/M2 | DIASTOLIC BLOOD PRESSURE: 77 MMHG | RESPIRATION RATE: 16 BRPM | WEIGHT: 92 LBS | OXYGEN SATURATION: 96 % | TEMPERATURE: 98.4 F | SYSTOLIC BLOOD PRESSURE: 147 MMHG
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
## 2021-09-02 DIAGNOSIS — Z23 NEED FOR RABIES VACCINATION: ICD-10-CM

## 2021-09-02 DIAGNOSIS — T07.XXXA MULTIPLE PUNCTURE WOUNDS: ICD-10-CM

## 2021-09-02 DIAGNOSIS — S60.221A CONTUSION OF RIGHT HAND, INITIAL ENCOUNTER: ICD-10-CM

## 2021-09-02 DIAGNOSIS — S69.91XA INJURY OF RIGHT HAND, INITIAL ENCOUNTER: Primary | ICD-10-CM

## 2021-09-02 PROCEDURE — 73110 X-RAY EXAM OF WRIST: CPT

## 2021-09-02 PROCEDURE — 90675 RABIES VACCINE IM: CPT | Performed by: EMERGENCY MEDICINE

## 2021-09-02 PROCEDURE — 99285 EMERGENCY DEPT VISIT HI MDM: CPT

## 2021-09-02 PROCEDURE — 90471 IMMUNIZATION ADMIN: CPT

## 2021-09-02 PROCEDURE — 74011250637 HC RX REV CODE- 250/637: Performed by: EMERGENCY MEDICINE

## 2021-09-02 PROCEDURE — 96372 THER/PROPH/DIAG INJ SC/IM: CPT

## 2021-09-02 PROCEDURE — 74011250636 HC RX REV CODE- 250/636: Performed by: EMERGENCY MEDICINE

## 2021-09-02 PROCEDURE — 90375 RABIES IG IM/SC: CPT | Performed by: EMERGENCY MEDICINE

## 2021-09-02 RX ORDER — AMOXICILLIN AND CLAVULANATE POTASSIUM 875; 125 MG/1; MG/1
1 TABLET, FILM COATED ORAL
Status: COMPLETED | OUTPATIENT
Start: 2021-09-02 | End: 2021-09-02

## 2021-09-02 RX ORDER — ACETAMINOPHEN 500 MG
1000 TABLET ORAL
Status: COMPLETED | OUTPATIENT
Start: 2021-09-02 | End: 2021-09-02

## 2021-09-02 RX ORDER — IBUPROFEN 600 MG/1
600 TABLET ORAL
Qty: 30 TABLET | Refills: 0 | Status: SHIPPED | OUTPATIENT
Start: 2021-09-02

## 2021-09-02 RX ORDER — IBUPROFEN 400 MG/1
400 TABLET ORAL
Status: COMPLETED | OUTPATIENT
Start: 2021-09-02 | End: 2021-09-02

## 2021-09-02 RX ORDER — AMOXICILLIN AND CLAVULANATE POTASSIUM 875; 125 MG/1; MG/1
1 TABLET, FILM COATED ORAL 2 TIMES DAILY
Qty: 14 TABLET | Refills: 0 | Status: SHIPPED | OUTPATIENT
Start: 2021-09-02 | End: 2021-09-09

## 2021-09-02 RX ORDER — ACETAMINOPHEN 500 MG
1000 TABLET ORAL 3 TIMES DAILY
Qty: 24 TABLET | Refills: 0 | Status: SHIPPED | OUTPATIENT
Start: 2021-09-02 | End: 2021-09-06

## 2021-09-02 RX ORDER — TRAMADOL HYDROCHLORIDE 50 MG/1
50 TABLET ORAL
Status: COMPLETED | OUTPATIENT
Start: 2021-09-02 | End: 2021-09-02

## 2021-09-02 RX ADMIN — RABIES IMMUNE GLOBULIN (HUMAN) 840 UNITS: 300 INJECTION, SOLUTION INFILTRATION; INTRAMUSCULAR at 19:36

## 2021-09-02 RX ADMIN — ACETAMINOPHEN 1000 MG: 500 TABLET ORAL at 19:34

## 2021-09-02 RX ADMIN — AMOXICILLIN AND CLAVULANATE POTASSIUM 1 TABLET: 875; 125 TABLET, FILM COATED ORAL at 19:34

## 2021-09-02 RX ADMIN — TRAMADOL HYDROCHLORIDE 50 MG: 50 TABLET, FILM COATED ORAL at 19:35

## 2021-09-02 RX ADMIN — IBUPROFEN 400 MG: 400 TABLET, FILM COATED ORAL at 21:32

## 2021-09-02 RX ADMIN — RABIES VACCINE 2.5 UNITS: KIT at 19:38

## 2021-09-02 NOTE — ED PROVIDER NOTES
72-year-old female brought in by EMS with injury to her right hand. Patient was feeding a stray cat last night when the cat bit her hand and she shook her hand to get the caught off. Patient reported immediate pain and started having some worsening swelling throughout the day. Patient denies any fevers or chills however has significant pain in the hand worsened to palpation. Patient reports due to the swelling having decreased range of motion of the fingers. No numbness or tingling. Patient has not iced the hand or taken any medications for pain. No anticoagulants. Tetanus shot is up-to-date within the last 2 years. Patient does not know the vaccination status of the stray cat. Cat had otherwise been acting normally had been at her when the she started started to move the food bowl. Cat cannot be observed. Denies any other injuries. Localized redness but no spreading redness up the arm. Past Medical History:   Diagnosis Date    Constipation     Diabetes ([address]     Glaucoma     Hyperlipemia     Hyperparathy[address] ([facility] [address]     Hypertension     Joint pain     Kidney stone     Migraine     Osteoporosis     TIA (transient ischemic attack)        No past surgical history on file.       Family History:   Problem Relation Age of Onset    Cancer Mother         bladder       Social History     Socioeconomic History    Marital status:      Spouse name: Not on file    Number of children: Not on file    Years of education: Not on file    Highest education level: Not on file   Occupational History    Not on file   Tobacco Use    Smoking status: Former Smoker    Smokeless tobacco: Never Used   Substance and Sexual Activity    Alcohol use: No    Drug use: Not on file    Sexual activity: Not on file   Other Topics Concern    Not on file   Social History Narrative    Not on file     Social Determinants of Health     Financial Resource Strain:     Difficulty of Paying Living Expenses:    Food Insecurity:     Worried About Running Out of Food in the Last Year:     [address] in the Last Year:    Transportation Needs:     Lack of Transportation (Medical):  Lack of Transportation (Non-Medical):    Physical Activity:     Days of Exercise per Week:     Minutes of Exercise per Session:    Stress:     Feeling of Stress :    Social Connections:     Frequency of Communication with Friends and Family:     Frequency of Social Gatherings with Friends and Family:     Attends [employer] Services:     Active Member of Clubs or Organizations:     Attends Club or Organization Meetings:     Marital Status:    Intimate Partner Violence:     Fear of Current or Ex-Partner:     Emotionally Abused:     Physically Abused:     Sexually Abused: ALLERGIES: Codeine, Contrast dye [iodine], Seafood, and Zocor [simvastatin]    Review of Systems   Constitutional: Negative for chills and fever. Musculoskeletal: Positive for joint swelling. Skin: Positive for wound. Neurological: Negative for weakness and numbness. All other systems reviewed and are negative. Vitals:    09/02/21 2015 09/02/21 2030 09/02/21 2045 09/02/21 2115   BP: (!) 151/85 137/68 (!) 158/60 (!) 147/77   Pulse:       Resp:       Temp:       SpO2: 96% 96%     Weight:                Physical Exam  HENT:      Head: Normocephalic. Nose: Nose normal.      Mouth/Throat:      Pharynx: Oropharynx is clear. Eyes:      Conjunctiva/sclera: Conjunctivae normal.   Cardiovascular:      Rate and Rhythm: Normal rate. Pulses:           Radial pulses are 2+ on the right side. Pulmonary:      Effort: Pulmonary effort is normal. No respiratory distress. Musculoskeletal:         General: Swelling, tenderness and signs of injury present. No deformity. Right hand: Swelling, laceration (Several puncture wounds) and tenderness present. No deformity. Decreased range of motion (due to pain). Normal sensation. There is no disruption of two-point discrimination. Normal capillary refill. Normal pulse. Cervical back: Neck supple. Skin:     Capillary Refill: Capillary refill takes less than 2 seconds. Comments: Puncture wounds with some mild erythema. No crepitus. No fluctuant masses. No lymphadenitis. Neurological:      General: No focal deficit present. Mental Status: She is alert. MDM  Number of Diagnoses or Management Options  Cat bite, initial encounter  Contusion of right hand, initial encounter  Injury of right hand, initial encounter  Multiple puncture wounds  Need for rabies vaccination  Diagnosis management comments: Patient presenting with injury to right hand from a cat bite. Mild swelling. No fever chills. Started on antibiotics, Augmentin. Unknown rabies status for the cat, given rabies immunoglobulin and rabies vaccine. Will have case management follow-up with patient to set up further rabies vaccinations. No foreign bodies on x-ray no other injuries. Discussed symptomatic treatment and icing the hand. Given follow-up with patient for Ortho for hand specialist as needed. Discussed the discharge impression and any labs and the results with the patient. Answered any questions and addressed any concerns. Discussed the importance of following up with their primary care provider and/or specialist.  Discussed signs or symptoms that would warrant return back to the ER for further evaluation. The patient is agreeable with discharge. Amount and/or Complexity of Data Reviewed  Tests in the radiology section of CPT®: reviewed           Procedures        No results found for this or any previous visit (from the past 24 hour(s)). XR WRIST RT AP/LAT/OBL MIN 3V    Result Date: 9/2/2021  EXAM: XR WRIST RT AP/LAT/OBL MIN 3V INDICATION: cat bite, injury. COMPARISON: None.  FINDINGS: Three  views of the right wrist demonstrate no fracture or other acute osseous or articular abnormality. Marked arthritis as evidenced by joint space narrowing, bony productive change, subchondral cystic change, and chondrocalcinosis. Scapholunate interval widening, likely from chronic injury/laxity. Soft tissue swelling and gas about the thumb. No radiopaque foreign body. 1.  Soft tissue swelling and gas about the thumb, likely from reported penetrating injury. No acute fracture nor radiopaque foreign body. 2.  Findings of CPPD arthropathy.

## 2021-09-03 NOTE — ED NOTES
Right hand and wrist scrubbed with Marina Klens and Tal wrist splint applied, Discharged home with prescription and instructions. Will be followed up by  Case management re - rabies injections . Left for home with daughter via wheel chair, no signs of distress.

## 2022-03-19 PROBLEM — R42 DIZZY: Status: ACTIVE | Noted: 2020-05-20

## 2022-03-20 PROBLEM — N39.0 UTI (URINARY TRACT INFECTION): Status: ACTIVE | Noted: 2020-05-20

## 2023-05-11 RX ORDER — IBUPROFEN 600 MG/1
TABLET ORAL EVERY 6 HOURS PRN
COMMUNITY
Start: 2021-09-02

## 2023-05-11 RX ORDER — LISINOPRIL 40 MG/1
40 TABLET ORAL DAILY
COMMUNITY

## 2023-05-11 RX ORDER — CLOPIDOGREL BISULFATE 75 MG/1
1 TABLET ORAL DAILY
COMMUNITY
Start: 2014-12-28

## 2023-05-11 RX ORDER — BUTALBITAL, ASPIRIN, AND CAFFEINE 325; 50; 40 MG/1; MG/1; MG/1
1 CAPSULE ORAL EVERY 8 HOURS PRN
COMMUNITY

## 2023-07-17 ENCOUNTER — HOSPITAL ENCOUNTER (EMERGENCY)
Facility: HOSPITAL | Age: 88
Discharge: HOME | End: 2023-07-17
Attending: EMERGENCY MEDICINE
Payer: MEDICARE

## 2023-07-17 VITALS
DIASTOLIC BLOOD PRESSURE: 84 MMHG | TEMPERATURE: 98.4 F | HEIGHT: 57 IN | RESPIRATION RATE: 16 BRPM | WEIGHT: 87 LBS | BODY MASS INDEX: 18.77 KG/M2 | SYSTOLIC BLOOD PRESSURE: 139 MMHG | HEART RATE: 67 BPM | OXYGEN SATURATION: 96 %

## 2023-07-17 DIAGNOSIS — G62.9 PERIPHERAL POLYNEUROPATHY: Primary | ICD-10-CM

## 2023-07-17 LAB
ALBUMIN SERPL-MCNC: 3.4 G/DL (ref 3.5–5)
ALBUMIN/GLOB SERPL: 1.1 (ref 1.1–2.2)
ALP SERPL-CCNC: 42 U/L (ref 45–117)
ALT SERPL-CCNC: 21 U/L (ref 12–78)
ANION GAP SERPL CALC-SCNC: 5 MMOL/L (ref 5–15)
AST SERPL-CCNC: 16 U/L (ref 15–37)
BASOPHILS # BLD: 0 K/UL (ref 0–0.1)
BASOPHILS NFR BLD: 0 % (ref 0–1)
BILIRUB SERPL-MCNC: 0.2 MG/DL (ref 0.2–1)
BUN SERPL-MCNC: 22 MG/DL (ref 6–20)
BUN/CREAT SERPL: 26 (ref 12–20)
CA-I BLD-SCNC: 1.16 MMOL/L (ref 1.13–1.32)
CALCIUM SERPL-MCNC: 9.2 MG/DL (ref 8.5–10.1)
CHLORIDE SERPL-SCNC: 98 MMOL/L (ref 97–108)
CO2 SERPL-SCNC: 31 MMOL/L (ref 21–32)
CREAT SERPL-MCNC: 0.84 MG/DL (ref 0.55–1.02)
DIFFERENTIAL METHOD BLD: ABNORMAL
EOSINOPHIL # BLD: 0 K/UL (ref 0–0.4)
EOSINOPHIL NFR BLD: 0 % (ref 0–7)
ERYTHROCYTE [DISTWIDTH] IN BLOOD BY AUTOMATED COUNT: 13.4 % (ref 11.5–14.5)
GLOBULIN SER CALC-MCNC: 3 G/DL (ref 2–4)
GLUCOSE SERPL-MCNC: 94 MG/DL (ref 65–100)
HCT VFR BLD AUTO: 33.7 % (ref 35–47)
HGB BLD-MCNC: 11.2 G/DL (ref 11.5–16)
IMM GRANULOCYTES # BLD AUTO: 0 K/UL (ref 0–0.04)
IMM GRANULOCYTES NFR BLD AUTO: 0 % (ref 0–0.5)
LYMPHOCYTES # BLD: 1.8 K/UL (ref 0.8–3.5)
LYMPHOCYTES NFR BLD: 25 % (ref 12–49)
MAGNESIUM SERPL-MCNC: 1.9 MG/DL (ref 1.6–2.4)
MCH RBC QN AUTO: 31.4 PG (ref 26–34)
MCHC RBC AUTO-ENTMCNC: 33.2 G/DL (ref 30–36.5)
MCV RBC AUTO: 94.4 FL (ref 80–99)
MONOCYTES # BLD: 0.6 K/UL (ref 0–1)
MONOCYTES NFR BLD: 9 % (ref 5–13)
NEUTS SEG # BLD: 4.7 K/UL (ref 1.8–8)
NEUTS SEG NFR BLD: 66 % (ref 32–75)
NRBC # BLD: 0 K/UL (ref 0–0.01)
NRBC BLD-RTO: 0 PER 100 WBC
PLATELET # BLD AUTO: 175 K/UL (ref 150–400)
PMV BLD AUTO: 11.7 FL (ref 8.9–12.9)
POTASSIUM SERPL-SCNC: 4.6 MMOL/L (ref 3.5–5.1)
PROT SERPL-MCNC: 6.4 G/DL (ref 6.4–8.2)
RBC # BLD AUTO: 3.57 M/UL (ref 3.8–5.2)
SODIUM SERPL-SCNC: 134 MMOL/L (ref 136–145)
WBC # BLD AUTO: 7.2 K/UL (ref 3.6–11)

## 2023-07-17 PROCEDURE — 93005 ELECTROCARDIOGRAM TRACING: CPT | Performed by: EMERGENCY MEDICINE

## 2023-07-17 PROCEDURE — 85025 COMPLETE CBC W/AUTO DIFF WBC: CPT

## 2023-07-17 PROCEDURE — 83735 ASSAY OF MAGNESIUM: CPT

## 2023-07-17 PROCEDURE — 36415 COLL VENOUS BLD VENIPUNCTURE: CPT

## 2023-07-17 PROCEDURE — 99284 EMERGENCY DEPT VISIT MOD MDM: CPT

## 2023-07-17 PROCEDURE — 82330 ASSAY OF CALCIUM: CPT

## 2023-07-17 PROCEDURE — 80053 COMPREHEN METABOLIC PANEL: CPT

## 2023-07-17 ASSESSMENT — PAIN DESCRIPTION - ORIENTATION: ORIENTATION: POSTERIOR

## 2023-07-17 ASSESSMENT — PAIN DESCRIPTION - LOCATION: LOCATION: OTHER (COMMENT)

## 2023-07-17 ASSESSMENT — PAIN - FUNCTIONAL ASSESSMENT: PAIN_FUNCTIONAL_ASSESSMENT: 0-10

## 2023-07-17 ASSESSMENT — PAIN SCALES - GENERAL: PAINLEVEL_OUTOF10: 5

## 2023-07-17 NOTE — ED PROVIDER NOTES
her hands and feet  Labs: ordered. ECG/medicine tests: ordered. CT head was considered; however, patient has not had any focal weakness or numbness to make me concerned for CVA, TIA, stroke, subarachnoid hemorrhage, intracranial bleeding. The patient is resting comfortably and feels better, is alert, talkative, interactive and in no distress. The repeat examination is unremarkable and benign. The patient is neurologically intact, has a normal mental status and is ambulatory in the ED. The history, exam, diagnostic testing (if any) and the patient's current condition do not suggest arrhythmia, STEMI, seizure, meningitis, electrolyte dyscrasia, encephalitis or other significant pathology that would warrant further testing, continued ED treatment, admission, neurological consultation, or other specialist evaluation at this point. The vital signs have been stable. The patient's condition is stable and appropriate for discharge. The patient will pursue further outpatient evaluation with the primary care physician or other designated or consulting physician as indicated in the discharge instructions. REASSESSMENT     ED Course as of 07/17/23 1653   Mon Jul 17, 2023   1647 EKG at 4:40 PM shows normal sinus rhythm, sinus arrhythmia, short WI, left axis deviation, right bundle branch block, no STEMI, no ectopy [IO]      ED Course User Index  [IO] Jessica Ridley MD       FINAL IMPRESSION      1.  Peripheral polyneuropathy          DISPOSITION/PLAN   DISPOSITION Decision To Discharge 07/17/2023 04:49:21 PM      PATIENT REFERRED TO:  Shelia Burgos MD  [address] [facility] [address] [phone]    Schedule an appointment as soon as possible for a visit   As needed      DISCHARGE MEDICATIONS:  New Prescriptions    No medications on file         (Please note that portions of this note were completed with a voice recognition program.  Efforts were made to edit the dictations but

## 2023-07-17 NOTE — ED TRIAGE NOTES
Patient arrives via EMS with complaints of bilateral hand and feet tingling on her fingers and toes since doing exercises today. Pt also reports pain to her tailbone x1 month.

## 2023-07-17 NOTE — PROGRESS NOTES
Patient reports that Dr. Ruth Victor is her new PCP since Dr. Deanna Mckeon retired, has an appointment tomorrow. Patient reports that she has a hx of neuropathy.

## 2023-07-18 LAB
EKG ATRIAL RATE: 68 BPM
EKG DIAGNOSIS: NORMAL
EKG P AXIS: 45 DEGREES
EKG P-R INTERVAL: 110 MS
EKG Q-T INTERVAL: 408 MS
EKG QRS DURATION: 118 MS
EKG QTC CALCULATION (BAZETT): 433 MS
EKG R AXIS: -72 DEGREES
EKG T AXIS: -25 DEGREES
EKG VENTRICULAR RATE: 68 BPM

## 2023-07-18 PROCEDURE — 93010 ELECTROCARDIOGRAM REPORT: CPT | Performed by: SPECIALIST

## 2023-08-10 ENCOUNTER — APPOINTMENT (OUTPATIENT)
Facility: HOSPITAL | Age: 88
End: 2023-08-10
Payer: COMMERCIAL

## 2023-08-10 ENCOUNTER — HOSPITAL ENCOUNTER (EMERGENCY)
Facility: HOSPITAL | Age: 88
Discharge: HOME | End: 2023-08-10
Attending: EMERGENCY MEDICINE
Payer: COMMERCIAL

## 2023-08-10 VITALS
RESPIRATION RATE: 23 BRPM | SYSTOLIC BLOOD PRESSURE: 127 MMHG | TEMPERATURE: 97.9 F | BODY MASS INDEX: 16.98 KG/M2 | DIASTOLIC BLOOD PRESSURE: 58 MMHG | HEIGHT: 57 IN | WEIGHT: 78.7 LBS | HEART RATE: 88 BPM | OXYGEN SATURATION: 97 %

## 2023-08-10 DIAGNOSIS — I10 ESSENTIAL HYPERTENSION: ICD-10-CM

## 2023-08-10 DIAGNOSIS — R07.9 CHEST PAIN, UNSPECIFIED TYPE: Primary | ICD-10-CM

## 2023-08-10 LAB
ALBUMIN SERPL-MCNC: 3.7 G/DL (ref 3.5–5)
ALBUMIN/GLOB SERPL: 1.3 (ref 1.1–2.2)
ALP SERPL-CCNC: 42 U/L (ref 45–117)
ALT SERPL-CCNC: 19 U/L (ref 12–78)
ANION GAP SERPL CALC-SCNC: 5 MMOL/L (ref 5–15)
AST SERPL-CCNC: 16 U/L (ref 15–37)
BASOPHILS # BLD: 0 K/UL (ref 0–0.1)
BASOPHILS NFR BLD: 1 % (ref 0–1)
BILIRUB SERPL-MCNC: 0.8 MG/DL (ref 0.2–1)
BUN SERPL-MCNC: 17 MG/DL (ref 6–20)
BUN/CREAT SERPL: 20 (ref 12–20)
CALCIUM SERPL-MCNC: 9.1 MG/DL (ref 8.5–10.1)
CHLORIDE SERPL-SCNC: 102 MMOL/L (ref 97–108)
CO2 SERPL-SCNC: 28 MMOL/L (ref 21–32)
COMMENT:: NORMAL
CREAT SERPL-MCNC: 0.87 MG/DL (ref 0.55–1.02)
DIFFERENTIAL METHOD BLD: NORMAL
EKG ATRIAL RATE: 68 BPM
EKG ATRIAL RATE: 92 BPM
EKG DIAGNOSIS: NORMAL
EKG DIAGNOSIS: NORMAL
EKG P AXIS: 22 DEGREES
EKG P-R INTERVAL: 108 MS
EKG P-R INTERVAL: 114 MS
EKG Q-T INTERVAL: 384 MS
EKG Q-T INTERVAL: 410 MS
EKG QRS DURATION: 114 MS
EKG QRS DURATION: 118 MS
EKG QTC CALCULATION (BAZETT): 435 MS
EKG QTC CALCULATION (BAZETT): 474 MS
EKG R AXIS: -69 DEGREES
EKG R AXIS: -78 DEGREES
EKG T AXIS: -26 DEGREES
EKG T AXIS: -50 DEGREES
EKG VENTRICULAR RATE: 68 BPM
EKG VENTRICULAR RATE: 92 BPM
EOSINOPHIL # BLD: 0 K/UL (ref 0–0.4)
EOSINOPHIL NFR BLD: 0 % (ref 0–7)
ERYTHROCYTE [DISTWIDTH] IN BLOOD BY AUTOMATED COUNT: 13.4 % (ref 11.5–14.5)
GLOBULIN SER CALC-MCNC: 2.8 G/DL (ref 2–4)
GLUCOSE SERPL-MCNC: 106 MG/DL (ref 65–100)
HCT VFR BLD AUTO: 36.9 % (ref 35–47)
HGB BLD-MCNC: 12.5 G/DL (ref 11.5–16)
IMM GRANULOCYTES # BLD AUTO: 0 K/UL (ref 0–0.04)
IMM GRANULOCYTES NFR BLD AUTO: 0 % (ref 0–0.5)
LYMPHOCYTES # BLD: 2 K/UL (ref 0.8–3.5)
LYMPHOCYTES NFR BLD: 36 % (ref 12–49)
MAGNESIUM SERPL-MCNC: 1.8 MG/DL (ref 1.6–2.4)
MCH RBC QN AUTO: 31.1 PG (ref 26–34)
MCHC RBC AUTO-ENTMCNC: 33.9 G/DL (ref 30–36.5)
MCV RBC AUTO: 91.8 FL (ref 80–99)
MONOCYTES # BLD: 0.5 K/UL (ref 0–1)
MONOCYTES NFR BLD: 9 % (ref 5–13)
NEUTS SEG # BLD: 3 K/UL (ref 1.8–8)
NEUTS SEG NFR BLD: 54 % (ref 32–75)
NRBC # BLD: 0 K/UL (ref 0–0.01)
NRBC BLD-RTO: 0 PER 100 WBC
NT PRO BNP: 264 PG/ML
PHOSPHATE SERPL-MCNC: 2.8 MG/DL (ref 2.6–4.7)
PLATELET # BLD AUTO: 155 K/UL (ref 150–400)
PMV BLD AUTO: 12 FL (ref 8.9–12.9)
POTASSIUM SERPL-SCNC: 4 MMOL/L (ref 3.5–5.1)
PROT SERPL-MCNC: 6.5 G/DL (ref 6.4–8.2)
RBC # BLD AUTO: 4.02 M/UL (ref 3.8–5.2)
SODIUM SERPL-SCNC: 135 MMOL/L (ref 136–145)
SPECIMEN HOLD: NORMAL
TROPONIN I SERPL HS-MCNC: 22 NG/L (ref 0–51)
TROPONIN I SERPL HS-MCNC: 22 NG/L (ref 0–51)
WBC # BLD AUTO: 5.5 K/UL (ref 3.6–11)

## 2023-08-10 PROCEDURE — 84484 ASSAY OF TROPONIN QUANT: CPT

## 2023-08-10 PROCEDURE — 96374 THER/PROPH/DIAG INJ IV PUSH: CPT

## 2023-08-10 PROCEDURE — 71045 X-RAY EXAM CHEST 1 VIEW: CPT

## 2023-08-10 PROCEDURE — 83880 ASSAY OF NATRIURETIC PEPTIDE: CPT

## 2023-08-10 PROCEDURE — 93010 ELECTROCARDIOGRAM REPORT: CPT | Performed by: SPECIALIST

## 2023-08-10 PROCEDURE — 83735 ASSAY OF MAGNESIUM: CPT

## 2023-08-10 PROCEDURE — 6370000000 HC RX 637 (ALT 250 FOR IP): Performed by: EMERGENCY MEDICINE

## 2023-08-10 PROCEDURE — 93005 ELECTROCARDIOGRAM TRACING: CPT | Performed by: EMERGENCY MEDICINE

## 2023-08-10 PROCEDURE — 36415 COLL VENOUS BLD VENIPUNCTURE: CPT

## 2023-08-10 PROCEDURE — 99285 EMERGENCY DEPT VISIT HI MDM: CPT

## 2023-08-10 PROCEDURE — 80053 COMPREHEN METABOLIC PANEL: CPT

## 2023-08-10 PROCEDURE — 85025 COMPLETE CBC W/AUTO DIFF WBC: CPT

## 2023-08-10 PROCEDURE — 6360000002 HC RX W HCPCS: Performed by: EMERGENCY MEDICINE

## 2023-08-10 PROCEDURE — 84100 ASSAY OF PHOSPHORUS: CPT

## 2023-08-10 RX ORDER — ASPIRIN 81 MG/1
324 TABLET, CHEWABLE ORAL ONCE
Status: COMPLETED | OUTPATIENT
Start: 2023-08-10 | End: 2023-08-10

## 2023-08-10 RX ORDER — HYDRALAZINE HYDROCHLORIDE 20 MG/ML
10 INJECTION INTRAMUSCULAR; INTRAVENOUS ONCE
Status: COMPLETED | OUTPATIENT
Start: 2023-08-10 | End: 2023-08-10

## 2023-08-10 RX ADMIN — HYDRALAZINE HYDROCHLORIDE 10 MG: 20 INJECTION, SOLUTION INTRAMUSCULAR; INTRAVENOUS at 05:00

## 2023-08-10 RX ADMIN — ASPIRIN 324 MG: 81 TABLET, CHEWABLE ORAL at 03:56

## 2023-08-10 RX ADMIN — NITROGLYCERIN 1 INCH: 20 OINTMENT TOPICAL at 03:56

## 2023-08-10 ASSESSMENT — PAIN - FUNCTIONAL ASSESSMENT: PAIN_FUNCTIONAL_ASSESSMENT: NONE - DENIES PAIN

## 2023-08-10 NOTE — ED NOTES
Bedside and Verbal shift change report given to Derrek (oncoming nurse) by Shari Nguyen (offgoing nurse). Report included the following information Nurse Handoff Report, ED Encounter Summary, ED SBAR, Adult Overview, Intake/Output, MAR, Recent Results, Cardiac Rhythm NSR, and Neuro Assessment.         Tressa Chahal RN  08/10/23 8023

## 2023-08-10 NOTE — ED PROVIDER NOTES
abnormality. Plan: Lab/EKG/chest x-ray/aspirin/education, reassurance, symptomatic treatment/aspirin/Nitropaste/serial exam/ Monitor and Reevaluate. Amount and/or Complexity of Data Reviewed  Labs: ordered. Radiology: ordered. ECG/medicine tests: ordered. Risk  OTC drugs. Prescription drug management. REASSESSMENT      Progress Note:   Pt has been reexamined by Rosendo Rodriges MD. Pt is feeling much better. Symptoms have improved. All available results have been reviewed with pt and any available family. Pt understands sx, dx, and tx in ED. Care plan has been outlined and questions have been answered. Pt is ready to go home. Will send home on chest pain and hypertension instruction. Outpatient referral with PCP as needed. Written by Rosendo Rodriges MD,7:01 AM       CONSULTS:  None    PROCEDURES:  Unless otherwise noted below, none     Procedures      FINAL IMPRESSION      1. Chest pain, unspecified type    2.  Essential hypertension          DISPOSITION/PLAN   DISPOSITION Decision To Discharge
08/10/2023 06:59:12 AM      PATIENT REFERRED TO:  Tod Diaz MD  [address] [facility] [address] [phone]    Schedule an appointment as soon as possible for a visit   for reevaluation and further treatment as needed      DISCHARGE MEDICATIONS:  New Prescriptions    No medications on file         (Please note that portions of this note were completed with a voice recognition program.  Efforts were made to edit the dictations but occasionally words are mis-transcribed.)    Paras Kruger MD (electronically signed)  Emergency Attending Physician / Physician Assistant / Nurse Practitioner            Dana Hennessy MD  08/10/23 0413       Dana Hennessy MD  08/10/23 2404

## 2023-08-10 NOTE — ED NOTES
Patient does not appear to be in any acute distress/shows no evidence of clinical instability at this time. Provider has reviewed discharge instructions with the patient/family. The patient/family verbalized understanding instructions as well as need for follow up for any further symptoms. Discharge papers given, education provided, and any questions answered. Patient discharged by provider.       Antonio Dubois RN  08/10/23 4195

## 2023-09-08 NOTE — ED TRIAGE NOTES
Form prepped and placed at providers desk.  Dedra Parada, TOI on 9/8/2023 at 9:46 AM     Pt arrives via EMS w/ c/c of dizziness. Pt reports that she woke up at 0430 & stood up to get out of bed @ 0630 & felt dizzy. Hx DM & HTN.

## 2023-09-26 ENCOUNTER — OFFICE VISIT (OUTPATIENT)
Age: 88
End: 2023-09-26
Payer: COMMERCIAL

## 2023-09-26 VITALS
HEIGHT: 55 IN | TEMPERATURE: 96.2 F | SYSTOLIC BLOOD PRESSURE: 110 MMHG | HEART RATE: 96 BPM | BODY MASS INDEX: 16.43 KG/M2 | OXYGEN SATURATION: 99 % | DIASTOLIC BLOOD PRESSURE: 60 MMHG | WEIGHT: 71 LBS

## 2023-09-26 DIAGNOSIS — R41.89 COGNITIVE IMPAIRMENT: Primary | ICD-10-CM

## 2023-09-26 DIAGNOSIS — R41.89 COGNITIVE IMPAIRMENT: ICD-10-CM

## 2023-09-26 PROCEDURE — 99204 OFFICE O/P NEW MOD 45 MIN: CPT | Performed by: PSYCHIATRY & NEUROLOGY

## 2023-09-26 RX ORDER — ASPIRIN 81 MG
TABLET, DELAYED RELEASE (ENTERIC COATED) ORAL
COMMUNITY
Start: 2023-08-01

## 2023-09-26 RX ORDER — AMLODIPINE BESYLATE 10 MG/1
TABLET ORAL
COMMUNITY
Start: 2023-08-30

## 2023-09-26 ASSESSMENT — MINI MENTAL STATE EXAM
WHAT MONTH IS THIS?: 1
SAY: READ THE WORDS ON THE PAGE AND THEN DO WHAT IT SAYS. THEN HAND THE PERSON
THE SHEET WITH CLOSE YOUR EYES ON IT. IF THE SUBJECT READS AND DOES NOT CLOSE THEIR EYES, REPEAT UP TO THREE TIMES. SCORE ONLY IF SUBJECT CLOSES EYES.: 1
SAY: I AM GOING TO NAME THREE OBJECTS. WHEN I AM FINISHED, I WANT YOU TO REPEAT
THEM. REMEMBER WHAT THEY ARE BECAUSE I AM GOING TO ASK YOU TO NAME THEM AGAIN IN
A FEW MINUTES.  SAY THE FOLLOWING WORDS SLOWLY AT 1-SECOND INTERVALS - BALL/ CAR/ MAN [ITERATIONS FOR REPEAT ADMINISTRATION]: 3
SAY: PUT THE PAPER DOWN ON THE FLOOR, SCORE IF PAPER IS PLACED BACK ON FLOOR: 1
PLACE DESIGN, ERASER AND PENCIL IN FRONT OF THE PERSON.  SAY:  COPY THIS DESIGN PLEASE.  SHOW: DESIGN. ALLOW: MULTIPLE TRIES. WAIT UNTIL PERSON IS FINISHED AND HANDS IT BACK. SCORE: ONLY FOR DIAGRAM WITH 4-SIDED FIGURE BETWEEN TWO 5-SIDED FIGURES: 0
WHAT IS TODAY'S DATE?: 0
NOW WHAT WERE THE THREE OBJECTS I ASKED YOU TO REMEMBER?: 1
WHAT CITY/TOWN ARE WE IN?: 1
SAY: FOLD THE PAPER IN HALF ONCE WITH BOTH HANDS, SCORE IF PAPER IS CORRECTLY FOLDED IN HALF.: 1
HAND THE PERSON A PENCIL AND PAPER. SAY: WRITE ANY COMPLETE SENTENCE ON THAT
PIECE OF PAPER. (NOTE: THE SENTENCE MUST MAKE SENSE. IGNORE SPELLING ERRORS): 1
WHAT STATE [OR PROVINCE] ARE WE IN?: 1
WHAT DAY OF THE WEEK IS THIS?: 0
SHOW: PENCIL [OBJECT] ASK: WHAT IS THIS CALLED?: 1
SAY: I WOULD LIKE YOU TO COUNT BACKWARD FROM 100 BY SEVENS: 5
WHAT FLOOR ARE WE ON [IN FACILITY]?/ WHAT ROOM ARE WE IN [IN HOME]?: 0
WHICH SEASON IS THIS?: 1
WHAT IS THE NAME OF THIS BUILDING [IN FACILITY]?/WHAT IS THE STREET ADDRESS OF THIS HOUSE [IN HOME]?: 0
SHOW: WRISTWATCH [OBJECT] ASK: WHAT IS THIS CALLED?: 1
WHAT COUNTRY ARE WE IN?: 1
ASK THE PERSON IF HE IS RIGHT OR LEFT-HANDED. TAKE A PIECE OF PAPER AND HOLD IT UP IN
FRONT OF THE PERSON. SAY: TAKE THIS PAPER IN YOUR RIGHT/LEFT HAND (WHICHEVER IS NON-
DOMINANT), SCORE IF PAPER IS PICKED UP IN CORRECT HAND.: 1
SAY: I WOULD LIKE YOU TO REPEAT THIS PHRASE AFTER ME: NO IFS, ANDS, OR BUTS.: 1
SUM ALL MMSE QUESTIONS FOR TOTAL SCORE [OUT OF 30].: 23
WHAT YEAR IS THIS?: 1

## 2023-09-26 NOTE — PROGRESS NOTES
07/17/2023 98  97 - 108 mmol/L Final    CO2 07/17/2023 31  21 - 32 mmol/L Final    Anion Gap 07/17/2023 5  5 - 15 mmol/L Final    Glucose 07/17/2023 94  65 - 100 mg/dL Final    BUN 07/17/2023 22 (H)  6 - 20 MG/DL Final    Creatinine 07/17/2023 0.84  0.55 - 1.02 MG/DL Final    Bun/Cre Ratio 07/17/2023 26 (H)  12 - 20   Final    Est, Glom Filt Rate 07/17/2023 >60  >60 ml/min/1.73m2 Final    Comment:    Pediatric calculator link: [facility].at. org/professionals/kdoqi/gfr_calculatorped     These results are not intended for use in patients <25years of age. eGFR results are calculated without a race factor using  the 2021 CKD-EPI equation. Careful clinical correlation is recommended, particularly when comparing to results calculated using previous equations. The CKD-EPI equation is less accurate in patients with extremes of muscle mass, extra-renal metabolism of creatinine, excessive creatine ingestion, or following therapy that affects renal tubular secretion.       Calcium 07/17/2023 9.2  8.5 - 10.1 MG/DL Final    Total Bilirubin 07/17/2023 0.2  0.2 - 1.0 MG/DL Final    ALT 07/17/2023 21  12 - 78 U/L Final    AST 07/17/2023 16  15 - 37 U/L Final    Alk Phosphatase 07/17/2023 42 (L)  45 - 117 U/L Final    Total Protein 07/17/2023 6.4  6.4 - 8.2 g/dL Final    Albumin 07/17/2023 3.4 (L)  3.5 - 5.0 g/dL Final    Globulin 07/17/2023 3.0  2.0 - 4.0 g/dL Final    Albumin/Globulin Ratio 07/17/2023 1.1  1.1 - 2.2   Final    Magnesium 07/17/2023 1.9  1.6 - 2.4 mg/dL Final    Calcium, Ionized 07/17/2023 1.16  1.13 - 1.32 mmol/L Final    Ventricular Rate 07/17/2023 68  BPM Final    Atrial Rate 07/17/2023 68  BPM Final    P-R Interval 07/17/2023 110  ms Final    QRS Duration 07/17/2023 118  ms Final    Q-T Interval 07/17/2023 408  ms Final    QTc Calculation (Bazett) 07/17/2023 433  ms Final    P Axis 07/17/2023 45  degrees Final    R Axis 07/17/2023 -72  degrees Final    T Axis 07/17/2023 -25  degrees Final    Diagnosis

## 2023-11-01 ENCOUNTER — OFFICE VISIT (OUTPATIENT)
Age: 88
End: 2023-11-01
Payer: MEDICARE

## 2023-11-01 DIAGNOSIS — F02.B2 MODERATE LATE ONSET ALZHEIMER'S DEMENTIA WITH PSYCHOTIC DISTURBANCE (HCC): Primary | ICD-10-CM

## 2023-11-01 DIAGNOSIS — F41.1 GENERALIZED ANXIETY DISORDER: ICD-10-CM

## 2023-11-01 DIAGNOSIS — R41.89 COGNITIVE DECLINE: ICD-10-CM

## 2023-11-01 DIAGNOSIS — G30.1 MODERATE LATE ONSET ALZHEIMER'S DEMENTIA WITH PSYCHOTIC DISTURBANCE (HCC): Primary | ICD-10-CM

## 2023-11-01 PROCEDURE — 1036F TOBACCO NON-USER: CPT | Performed by: CLINICAL NEUROPSYCHOLOGIST

## 2023-11-01 PROCEDURE — 1123F ACP DISCUSS/DSCN MKR DOCD: CPT | Performed by: CLINICAL NEUROPSYCHOLOGIST

## 2023-11-01 PROCEDURE — 90791 PSYCH DIAGNOSTIC EVALUATION: CPT | Performed by: CLINICAL NEUROPSYCHOLOGIST

## 2023-11-01 PROCEDURE — 90785 PSYTX COMPLEX INTERACTIVE: CPT | Performed by: CLINICAL NEUROPSYCHOLOGIST

## 2023-11-01 NOTE — PROGRESS NOTES
[address] [facility]  [address] [address] [place] [phone] Office   [phone] Fax      Neuropsychology    Initial Diagnostic Interview Note      Referral:  Prasad Yeh MD    Lu Becerra is a 80 y.o. right handed  and   female who was accompanied by her niece to the initial clinical interview on 11/1/23. Please refer to her medical records for details pertaining to her history. At the start of the appointment, I reviewed the patient's [facility] Epic Chart (including Media scanned in from previous providers) for the active Problem List, all pertinent Past Medical Hx, medications, recent radiologic and laboratory findings. In addition, I reviewed pt's documented Immunization Record and Encounter History. The patient  has a past medical history of Constipation, Diabetes ([address]), Glaucoma, Hyperlipemia, Hyperparathyroidism ([address]), Hypertension, Joint pain, Kidney stone, Migraine, Osteoporosis, and TIA (transient ischemic attack). She  has no past surgical history on file. She reports that her memory is fine. 12th grade completed without history of previously diagnosed LD and/or receipt of special education services. She has had significant decline in short term memory. Her long term memory is fine. She can't tell you something after a few times. She is hallucinating during the day and night. Seeing people and hearing sounds during the day and night. Up all night hearing voices a few weeks ago. Gets nervous  She forgets to eat. She is frail today. She has been recently staying with niece and niece's mother. Has a nurse coming during the week. She has been doing PT at home for balance problems. She has no known background stroke (other than TIA), meningitis/encephalitis, MIRIAM Fever, Lupus, Lyme, TBI, sz. She doesn't get off the couch.   She was

## 2023-11-02 ENCOUNTER — PROCEDURE VISIT (OUTPATIENT)
Age: 88
End: 2023-11-02

## 2023-11-02 DIAGNOSIS — F41.9 ANXIETY AND DEPRESSION: ICD-10-CM

## 2023-11-02 DIAGNOSIS — F02.B2 MODERATE LATE ONSET ALZHEIMER'S DEMENTIA WITH PSYCHOTIC DISTURBANCE (HCC): Primary | ICD-10-CM

## 2023-11-02 DIAGNOSIS — G30.1 MODERATE LATE ONSET ALZHEIMER'S DEMENTIA WITH PSYCHOTIC DISTURBANCE (HCC): Primary | ICD-10-CM

## 2023-11-02 DIAGNOSIS — F32.A ANXIETY AND DEPRESSION: ICD-10-CM

## 2023-11-11 NOTE — PROGRESS NOTES
admitted to [address] [facility] for UTI. She eats well on strict diet for diabetes but weight had dropped. Daughter has POA but has cancer and cannot do much in terms of helping and niece and other family helps. Seen by Dr Claudell Stalling in 2015 for stroke. On ASA 81 and Plavix       INDICATION: dizziness. Diabetes mellitus, hypertension. Exam: Noncontrast CT of the brain is performed with 5 mm collimation. CT dose reduction was achieved with the use of the standardized protocol  tailored for this examination and automatic exposure control for dose  modulation. Direct comparison made to prior MR dated December 2014. FINDINGS: There is a mild diffuse cortical atrophy. There is no acute  intracranial hemorrhage, mass, mass effect or herniation. Ventricular system is  normal. The gray-white matter differentiation is well-preserved. The mastoid air  cells are well pneumatized. The visualized paranasal sinuses are normal.     IMPRESSION:  IMPRESSION: No acute intracranial hemorrhage, mass or infarct.      Neuropsychological Mental Status Exam (NMSE):      Historian: Fair  Praxis: No UE apraxia  R/L Orientation: Intact to self and to other  Dress: within normal limits   Weight: within normal limits   Appearance/Hygiene: Frail appearing  Gait: not assessed  Assistive Devices: Glasses  Mood: within normal limits   Affect: within normal limits   Comprehension: within normal limits   Thought Process: within normal limits   Expressive Language: within normal limits   Receptive Language: within normal limits   Motor:  No cognitive or motor perseveration  ETOH: Denied  Tobacco: Quit many years ao  Illicit: Denied  SI/HI: Denied  Psychosis: hallucinations (visual and auditory, day and night)   Insight: Fair  Judgment: Impaired  Other Psych:      Past Medical History:   Diagnosis Date    Constipation     Diabetes (HCC)     Glaucoma     Hyperlipemia     Hyperparathyroidism ([address])     Hypertension     Joint pain     Kidney stone

## 2023-11-13 ENCOUNTER — TELEPHONE (OUTPATIENT)
Age: 88
End: 2023-11-13

## 2023-11-13 NOTE — TELEPHONE ENCOUNTER
Patient's niece is calling to confirm if test results for patient is ready to be picked up.     Please contact once ready to . Needs them ASAP

## 2023-11-19 ENCOUNTER — TELEPHONE (OUTPATIENT)
Age: 88
End: 2023-11-19

## 2023-11-30 NOTE — PROGRESS NOTES
Neurology Progress Note    Patient ID:  Darrick Argueta  [id number]  26 y.o.  6/23/1933      Subjective:   History:  Darrick Argueta is a 80 y.o. female who has a past medical history of Constipation, Diabetes ([address]), Glaucoma, Hyperlipemia, Hyperparathyroidism ([address]), Hypertension, Joint pain, Kidney stone, Migraine, Osteoporosis, and TIA (transient ischemic attack). Who since 2022, noted short term memory issues described as mistaking her medications, gets nervous, forgetting breakfast. (+) hallucinations at night - described as seeing people but not recently. 2 months before, patient was living by herself. Now currently staying with her niece and mother. Has a nurse coming twice a week with significant improvement. Also getting home PT for balance issues. (+) admitted at [facility] for UTI. Seen by Dr Calderon Later in 2015 for stroke. On ASA 81 and Plavix 75. MMSE 23/30. Patient remains the same. History of recurrent UTI which can cause confusion. Lives with niece and sister due to daughter who has the POA having stage 4 CA. Neuropsychological testing (11/10/23): Impressions & Recommendations: This is somewhat truncated exam because of the patient's anxiety and cognitive difficulties interfering with the test administration battery which was originally ordered. It was accommodated to address both her cognitive concerns and her emotions regarding testing. Despite this, the results from objective data that we were able to collect show clear evidence of moderate dementia. This is likely Alzheimer's. There is clearly depression and anxiety issues as well but in neurocognitive data are not consistent with a pure pseudodementia. Instead, mood issues exacerbate. In addition to continue medical care, my recommendations include consideration for treatment for dementia, attention, and depression/anxiety. Psychiatric treatment for psychosis is also advised.   The patient lacks capacity

## 2023-12-01 ENCOUNTER — OFFICE VISIT (OUTPATIENT)
Age: 88
End: 2023-12-01
Payer: COMMERCIAL

## 2023-12-01 VITALS — OXYGEN SATURATION: 98 % | HEART RATE: 76 BPM | RESPIRATION RATE: 18 BRPM

## 2023-12-01 DIAGNOSIS — F02.B18 MODERATE LATE ONSET ALZHEIMER'S DEMENTIA WITH OTHER BEHAVIORAL DISTURBANCE (HCC): ICD-10-CM

## 2023-12-01 DIAGNOSIS — R41.89 COGNITIVE IMPAIRMENT: Primary | ICD-10-CM

## 2023-12-01 DIAGNOSIS — R41.89 COGNITIVE IMPAIRMENT: ICD-10-CM

## 2023-12-01 DIAGNOSIS — G30.1 MODERATE LATE ONSET ALZHEIMER'S DEMENTIA WITH OTHER BEHAVIORAL DISTURBANCE (HCC): ICD-10-CM

## 2023-12-01 PROCEDURE — 99215 OFFICE O/P EST HI 40 MIN: CPT | Performed by: PSYCHIATRY & NEUROLOGY

## 2023-12-01 PROCEDURE — 1123F ACP DISCUSS/DSCN MKR DOCD: CPT | Performed by: PSYCHIATRY & NEUROLOGY

## 2023-12-01 RX ORDER — DONEPEZIL HYDROCHLORIDE 5 MG/1
5 TABLET, FILM COATED ORAL DAILY
Qty: 30 TABLET | Refills: 5 | Status: SHIPPED | OUTPATIENT
Start: 2023-12-01